# Patient Record
Sex: MALE | Race: WHITE | NOT HISPANIC OR LATINO | Employment: FULL TIME | URBAN - METROPOLITAN AREA
[De-identification: names, ages, dates, MRNs, and addresses within clinical notes are randomized per-mention and may not be internally consistent; named-entity substitution may affect disease eponyms.]

---

## 2018-06-03 ENCOUNTER — HOSPITAL ENCOUNTER (EMERGENCY)
Facility: HOSPITAL | Age: 18
Discharge: HOME | End: 2018-06-03
Attending: EMERGENCY MEDICINE | Admitting: EMERGENCY MEDICINE

## 2018-06-03 VITALS
SYSTOLIC BLOOD PRESSURE: 110 MMHG | HEART RATE: 93 BPM | RESPIRATION RATE: 16 BRPM | WEIGHT: 160 LBS | BODY MASS INDEX: 25.71 KG/M2 | DIASTOLIC BLOOD PRESSURE: 63 MMHG | HEIGHT: 66 IN | TEMPERATURE: 99.1 F | OXYGEN SATURATION: 92 %

## 2018-06-03 DIAGNOSIS — N61.0 ACUTE MASTITIS OF RIGHT BREAST: Primary | ICD-10-CM

## 2018-06-03 PROCEDURE — 99283 EMERGENCY DEPT VISIT LOW MDM: CPT

## 2018-06-03 RX ORDER — CLINDAMYCIN HYDROCHLORIDE 300 MG/1
300 CAPSULE ORAL 3 TIMES DAILY
Qty: 30 CAPSULE | Refills: 0 | Status: SHIPPED | OUTPATIENT
Start: 2018-06-03 | End: 2020-07-28

## 2018-06-03 RX ORDER — IBUPROFEN 800 MG/1
800 TABLET ORAL ONCE
Status: COMPLETED | OUTPATIENT
Start: 2018-06-03 | End: 2018-06-03

## 2018-06-03 RX ORDER — NAPROXEN 500 MG/1
500 TABLET ORAL 2 TIMES DAILY WITH MEALS
Qty: 14 TABLET | Refills: 0 | Status: SHIPPED | OUTPATIENT
Start: 2018-06-03 | End: 2020-07-28

## 2018-06-03 RX ORDER — PROGESTERONE 50 MG/ML
25 INJECTION, SOLUTION INTRAMUSCULAR
COMMUNITY
End: 2020-07-28

## 2018-06-03 RX ORDER — CLINDAMYCIN HYDROCHLORIDE 300 MG/1
300 CAPSULE ORAL ONCE
Status: COMPLETED | OUTPATIENT
Start: 2018-06-03 | End: 2018-06-03

## 2018-06-03 RX ORDER — ESTRADIOL VALERATE 10 MG/ML
0.12 INJECTION INTRAMUSCULAR
COMMUNITY
End: 2020-07-28

## 2018-06-03 RX ADMIN — CLINDAMYCIN HYDROCHLORIDE 300 MG: 300 CAPSULE ORAL at 04:43

## 2018-06-03 RX ADMIN — IBUPROFEN 800 MG: 800 TABLET ORAL at 04:43

## 2018-06-03 NOTE — ED PROVIDER NOTES
" EMERGENCY DEPARTMENT ENCOUNTER    CHIEF COMPLAINT  Chief Complaint: Breast Pain  History given by: Patient  History limited by:   Room Number: 19/19  PMD: Anthony Leary MD      HPI:  Pt is a 17 y.o. female who presents complaining of R breast pain that onset yesterday. Pt believes that she may have mastitis as she has had similar sxs in the past. Pt is taking estrogen and progesterone, but is not currently taking progesterone due to medication being on back order. She reports developing a mild subjective fever.    Duration: 1 day  Onset: gradual  Timing: constant  Location: R breast  Radiation: none  Quality: \"pain\"  Intensity/Severity: moderate  Progression: unchanged  Associated Symptoms: fever  Aggravating Factors: none  Alleviating Factors: none  Previous Episodes: similar sxs previously and was dx with mastitis  Treatment before arrival: none    PAST MEDICAL HISTORY  Active Ambulatory Problems     Diagnosis Date Noted   • No Active Ambulatory Problems     Resolved Ambulatory Problems     Diagnosis Date Noted   • No Resolved Ambulatory Problems     Past Medical History:   Diagnosis Date   • Mastitis        PAST SURGICAL HISTORY  History reviewed. No pertinent surgical history.    FAMILY HISTORY  History reviewed. No pertinent family history.    SOCIAL HISTORY  Social History     Social History   • Marital status: Single     Spouse name: N/A   • Number of children: N/A   • Years of education: N/A     Occupational History   • Not on file.     Social History Main Topics   • Smoking status: Never Smoker   • Smokeless tobacco: Not on file   • Alcohol use No   • Drug use: Yes     Types: Marijuana   • Sexual activity: Defer     Other Topics Concern   • Not on file     Social History Narrative   • No narrative on file       ALLERGIES  Vancomycin    REVIEW OF SYSTEMS  Review of Systems   Constitutional: Negative for fever.   HENT: Negative for sore throat.    Eyes: Negative.    Respiratory: Negative for cough " and shortness of breath.    Cardiovascular: Negative for chest pain.   Gastrointestinal: Negative for abdominal pain, diarrhea and vomiting.   Genitourinary: Negative for dysuria.   Musculoskeletal: Negative for neck pain.   Skin: Negative for rash.        Breast Pain   Allergic/Immunologic: Negative.    Neurological: Negative for weakness, numbness and headaches.   Hematological: Negative.    Psychiatric/Behavioral: Negative.    All other systems reviewed and are negative.      PHYSICAL EXAM  ED Triage Vitals   Temp Heart Rate Resp BP SpO2   06/03/18 0306 06/03/18 0258 06/03/18 0258 06/03/18 0306 06/03/18 0258   99.1 °F (37.3 °C) 101 16 121/68 100 %      Temp src Heart Rate Source Patient Position BP Location FiO2 (%)   06/03/18 0306 -- 06/03/18 0258 06/03/18 0258 --   Tympanic  Lying Right arm        Physical Exam   Constitutional: She is oriented to person, place, and time. No distress.   HENT:   Head: Normocephalic and atraumatic.   Eyes: EOM are normal. Pupils are equal, round, and reactive to light.   Neck: Normal range of motion. Neck supple.   Cardiovascular: Normal rate, regular rhythm and normal heart sounds.    Pulmonary/Chest: Effort normal and breath sounds normal. No respiratory distress. Right breast exhibits tenderness (with warmth and erythema).   Female chaperone present during breast exam   Abdominal: Soft. There is no tenderness. There is no rebound and no guarding.   Musculoskeletal: Normal range of motion. She exhibits no edema.   Neurological: She is alert and oriented to person, place, and time. She has normal sensation and normal strength.   Skin: Skin is warm and dry. No rash noted.   Psychiatric: Mood and affect normal.   Nursing note and vitals reviewed.        PROCEDURES  Procedures      PROGRESS AND CONSULTS     4:22 AM  Informed the pt of plan to discharge with Clindamycin and Naproxen. Pt understands and agrees with plan. All concerns addressed.  5:03 AM  Reviewed pt's history and  workup with Dr. Katz.        MEDICAL DECISION MAKING  Results were reviewed/discussed with the patient and they were also made aware of online access. Pt also made aware that some labs, such as cultures, will not be resulted during ER visit and follow up with PMD is necessary.     MDM  Number of Diagnoses or Management Options  Acute mastitis of right breast:      Amount and/or Complexity of Data Reviewed  Review and summarize past medical records: yes (Mastitis without abscess seen on US at [place] 1/2018. Pt started on Clindamycin.)           DIAGNOSIS  Final diagnoses:   Acute mastitis of right breast       DISPOSITION  DISCHARGE    Patient discharged in stable condition.    Reviewed implications of results, diagnosis, meds, responsibility to follow up, warning signs and symptoms of possible worsening, potential complications and reasons to return to ER.    Patient/Family voiced understanding of above instructions.    Discussed plan for discharge, as there is no emergent indication for admission. Patient referred to primary care provider for BP management due to today's BP. Pt/family is agreeable and understands need for follow up and repeat testing.  Pt is aware that discharge does not mean that nothing is wrong but it indicates no emergency is present that requires admission and they must continue care with follow-up as given below or physician of their choice.     FOLLOW-UP  Anthony Leary MD  [address] [address]  [phone]    Schedule an appointment as soon as possible for a visit in 3 days           Medication List      New Prescriptions    clindamycin 300 MG capsule  Commonly known as:  CLEOCIN  Take 1 capsule by mouth 3 (Three) Times a Day.     naproxen 500 MG tablet  Commonly known as:  NAPROSYN  Take 1 tablet by mouth 2 (Two) Times a Day With Meals.              Latest Documented Vital Signs:  As of 5:05 AM  BP- 110/63 HR- 93 Temp- 99.1 °F (37.3 °C) (Tympanic) O2 sat-  92%    --  Documentation assistance provided by danyel Angel for Flex Adan PA-C.  Information recorded by the scribe was done at my direction and has been verified and validated by me.     Marcelino Angel  06/03/18 0503       MEL Montague  06/03/18 0505

## 2018-06-03 NOTE — DISCHARGE INSTRUCTIONS
Home, rest, medicine as prescribed, apply warm compresses, follow up with PCP for recheck.  Return to care with further concerns.

## 2018-08-13 ENCOUNTER — HOSPITAL ENCOUNTER (EMERGENCY)
Facility: HOSPITAL | Age: 18
Discharge: HOME | End: 2018-08-13
Attending: EMERGENCY MEDICINE | Admitting: EMERGENCY MEDICINE

## 2018-08-13 VITALS
SYSTOLIC BLOOD PRESSURE: 110 MMHG | DIASTOLIC BLOOD PRESSURE: 70 MMHG | RESPIRATION RATE: 16 BRPM | OXYGEN SATURATION: 100 % | HEART RATE: 63 BPM | TEMPERATURE: 98.3 F

## 2018-08-13 DIAGNOSIS — K62.5 BRIGHT RED RECTAL BLEEDING: Primary | ICD-10-CM

## 2018-08-13 LAB
BASOPHILS # BLD AUTO: 0.04 10*3/MM3 (ref 0–0.2)
BASOPHILS NFR BLD AUTO: 0.5 % (ref 0–1.5)
DEPRECATED RDW RBC AUTO: 43.1 FL (ref 37–54)
EOSINOPHIL # BLD AUTO: 0.57 10*3/MM3 (ref 0–0.7)
EOSINOPHIL NFR BLD AUTO: 6.7 % (ref 0.3–6.2)
ERYTHROCYTE [DISTWIDTH] IN BLOOD BY AUTOMATED COUNT: 12.8 % (ref 11.7–13)
HCT VFR BLD AUTO: 37.9 % (ref 35.6–45.5)
HGB BLD-MCNC: 11.9 G/DL (ref 11.9–15.5)
IMM GRANULOCYTES # BLD: 0.02 10*3/MM3 (ref 0–0.03)
IMM GRANULOCYTES NFR BLD: 0.2 % (ref 0–0.5)
LYMPHOCYTES # BLD AUTO: 3.67 10*3/MM3 (ref 0.9–4.8)
LYMPHOCYTES NFR BLD AUTO: 43.3 % (ref 19.6–45.3)
MCH RBC QN AUTO: 28.7 PG (ref 26.9–32)
MCHC RBC AUTO-ENTMCNC: 31.4 G/DL (ref 32.4–36.3)
MCV RBC AUTO: 91.5 FL (ref 80.5–98.2)
MONOCYTES # BLD AUTO: 0.5 10*3/MM3 (ref 0.2–1.2)
MONOCYTES NFR BLD AUTO: 5.9 % (ref 5–12)
NEUTROPHILS # BLD AUTO: 3.67 10*3/MM3 (ref 1.9–8.1)
NEUTROPHILS NFR BLD AUTO: 43.4 % (ref 42.7–76)
PLATELET # BLD AUTO: 262 10*3/MM3 (ref 140–500)
PMV BLD AUTO: 10.1 FL (ref 6–12)
RBC # BLD AUTO: 4.14 10*6/MM3 (ref 3.9–5.2)
WBC NRBC COR # BLD: 8.47 10*3/MM3 (ref 4.5–10.7)

## 2018-08-13 PROCEDURE — 99283 EMERGENCY DEPT VISIT LOW MDM: CPT

## 2018-08-13 PROCEDURE — 85025 COMPLETE CBC W/AUTO DIFF WBC: CPT | Performed by: EMERGENCY MEDICINE

## 2018-08-13 NOTE — ED NOTES
"2 episodes of \"bright and dark\" red blood in stool today. Abd cramping for 2 months. Has not yet been seen for GI symptoms.     No recent trauma to abd. No urinary symptoms.     Skin pwd. Breathing even and unlabored. In harleen.      Amita Lyle RN  08/13/18 1906    "

## 2018-08-13 NOTE — ED NOTES
Pt requesting to have name changed and refuses to wear bracelet with former name. Reminded that registration will update all their information and that this RN does not want to incorrectly change information attached to the social security number that is in the system.     Thanked for pt's and family member's patience and understanding of the process     Amita Lyle RN  08/13/18 1798

## 2018-08-14 NOTE — ED PROVIDER NOTES
[facility] EMERGENCY DEPARTMENT ENCOUNTER    CHIEF COMPLAINT  Chief Complaint: rectal bleeding  History given by: patient  History limited by: none  [facility] Room Number: 38/38  PMD: Anthony Leary MD      HPI:  Pt is a 17 y.o. female who presents complaining of two episodes of rectal bleeding after having a BM. He advised after his BM he noticed as significant amount of blood in the bowl of the toilet and on the tissue paper. He noticed the blood was mixed with stool. Denies burning or itching of the rectum, but has some residual rectal pain and mild abdominal cramping.    Onset: sudden  Duration: w/ BM  Severity: moderate  Associated symptoms: rectal pain  Previous treatment: none    PAST MEDICAL HISTORY  Active Ambulatory Problems     Diagnosis Date Noted   • No Active Ambulatory Problems     Resolved Ambulatory Problems     Diagnosis Date Noted   • No Resolved Ambulatory Problems     Past Medical History:   Diagnosis Date   • Mastitis        PAST SURGICAL HISTORY  History reviewed. No pertinent surgical history.    FAMILY HISTORY  History reviewed. No pertinent family history.    SOCIAL HISTORY  Social History     Social History   • Marital status: Single     Spouse name: N/A   • Number of children: N/A   • Years of education: N/A     Occupational History   • Not on file.     Social History Main Topics   • Smoking status: Never Smoker   • Smokeless tobacco: Never Used   • Alcohol use No   • Drug use: Yes     Types: Marijuana   • Sexual activity: Defer     Other Topics Concern   • Not on file     Social History Narrative   • No narrative on file       ALLERGIES  Vancomycin    REVIEW OF SYSTEMS  Review of Systems   Constitutional: Negative for fever.   HENT: Negative for sore throat.    Eyes: Negative.    Respiratory: Negative for cough and shortness of breath.    Cardiovascular: Negative for chest pain.   Gastrointestinal: Positive for abdominal pain (cramping), blood in stool and rectal pain. Negative for diarrhea  and vomiting.   Genitourinary: Negative for dysuria.   Musculoskeletal: Negative for neck pain.   Skin: Negative for rash.   Allergic/Immunologic: Negative.    Neurological: Negative for weakness, numbness and headaches.   Hematological: Negative.    Psychiatric/Behavioral: Negative.    All other systems reviewed and are negative.      PHYSICAL EXAM  ED Triage Vitals [08/13/18 1900]   Temp Heart Rate Resp BP SpO2   98.3 °F (36.8 °C) 66 17 -- 99 %      Temp src Heart Rate Source Patient Position BP Location FiO2 (%)   -- -- -- -- --       Physical Exam   Constitutional: She is oriented to person, place, and time. No distress.   Eyes: EOM are normal.   Neck: Normal range of motion.   Cardiovascular: Normal rate and regular rhythm.    Pulmonary/Chest: Effort normal and breath sounds normal. No respiratory distress.   Abdominal: Soft. Bowel sounds are normal. She exhibits no distension. There is no tenderness. There is no rebound and no guarding.   Genitourinary:   Genitourinary Comments: Rectal exam: no active bleeding or external hemorrhoids.   Neurological: She is alert and oriented to person, place, and time. She has normal sensation and normal strength.   Skin: Skin is warm and dry.   Psychiatric: Affect normal.   Nursing note and vitals reviewed.      LAB RESULTS  Lab Results (last 24 hours)     Procedure Component Value Units Date/Time    CBC & Differential [[id number]] Collected:  08/13/18 2022    Specimen:  Blood Updated:  08/13/18 2039    Narrative:       The following orders were created for panel order CBC & Differential.  Procedure                               Abnormality         Status                     ---------                               -----------         ------                     CBC Auto Differential[[id number]]        Abnormal            Final result                 Please view results for these tests on the individual orders.    CBC Auto Differential [[id number]]  (Abnormal) Collected:   08/13/18 2022    Specimen:  Blood Updated:  08/13/18 2039     WBC 8.47 10*3/mm3      RBC 4.14 10*6/mm3      Hemoglobin 11.9 g/dL      Hematocrit 37.9 %      MCV 91.5 fL      MCH 28.7 pg      MCHC 31.4 (L) g/dL      RDW 12.8 %      RDW-SD 43.1 fl      MPV 10.1 fL      Platelets 262 10*3/mm3      Neutrophil % 43.4 %      Lymphocyte % 43.3 %      Monocyte % 5.9 %      Eosinophil % 6.7 (H) %      Basophil % 0.5 %      Immature Grans % 0.2 %      Neutrophils, Absolute 3.67 10*3/mm3      Lymphocytes, Absolute 3.67 10*3/mm3      Monocytes, Absolute 0.50 10*3/mm3      Eosinophils, Absolute 0.57 10*3/mm3      Basophils, Absolute 0.04 10*3/mm3      Immature Grans, Absolute 0.02 10*3/mm3           I ordered the above labs and reviewed the results    RADIOLOGY  No orders to display        I ordered the above noted radiological studies. Interpreted by radiologist. Reviewed by me in PACS.       PROCEDURES  Procedures      PROGRESS AND CONSULTS      2010  Discussed the plan to do blood work and if nml will d/c w/ referral to colorectal surgery. Pt understands and agrees with the plan, all questions answered.    MEDICAL DECISION MAKING  Results were reviewed/discussed with the patient and they were also made aware of online access. Pt also made aware that some labs, such as cultures, will not be resulted during ER visit and follow up with PMD is necessary.     MDM  Number of Diagnoses or Management Options     Amount and/or Complexity of Data Reviewed  Clinical lab tests: reviewed (unremarkable)    Patient Progress  Patient progress: stable         DIAGNOSIS  Final diagnoses:   Bright red rectal bleeding       DISPOSITION  DISCHARGE    Patient discharged in stable condition.    Reviewed implications of results, diagnosis, meds, responsibility to follow up, warning signs and symptoms of possible worsening, potential complications and reasons to return to ER.    Patient/Family voiced understanding of above instructions.    Discussed  plan for discharge, as there is no emergent indication for admission. Patient referred to primary care provider for BP management due to today's BP. Pt/family is agreeable and understands need for follow up and repeat testing.  Pt is aware that discharge does not mean that nothing is wrong but it indicates no emergency is present that requires admission and they must continue care with follow-up as given below or physician of their choice.     FOLLOW-UP  Danielle Malloy MD  [address] [address]  [phone]    Call            Medication List      No changes were made to your prescriptions during this visit.       Latest Documented Vital Signs:  As of 8:49 PM  BP-   HR- 66 Temp- 98.3 °F (36.8 °C) O2 sat- 99%    --  Documentation assistance provided by danyel Dinero for Dr. Carrero.  Information recorded by the scribe was done at my direction and has been verified and validated by me.     Edita Dinero  08/13/18 3157       Mir Carrero MD  08/13/18 2584

## 2020-07-01 ENCOUNTER — TELEPHONE (OUTPATIENT)
Dept: FAMILY MEDICINE CLINIC | Facility: CLINIC | Age: 20
End: 2020-07-01

## 2020-07-01 NOTE — TELEPHONE ENCOUNTER
RHODA WITH [facility] CALLED TO REQUEST A PA FOR THE MEDICATION Estradiol Valerate 10 MG/ML oil IT WILL NEED TO HAVE A DIAGNOSIS CODE AND THE REST OF THE FORM TO BE FILLED OUT. RHODA REQUESTED TO GET A CALL BACK    BEST CALL BACK  [phone]

## 2020-07-06 NOTE — TELEPHONE ENCOUNTER
Ayah with [facility] states she was unaware that a form needed to be sent , and  is going to fax the form today.

## 2020-07-08 ENCOUNTER — TELEPHONE (OUTPATIENT)
Dept: FAMILY MEDICINE CLINIC | Facility: CLINIC | Age: 20
End: 2020-07-08

## 2020-07-08 NOTE — TELEPHONE ENCOUNTER
Ayah with [facility] called back in checking the status of a PA. She said that she faxed the PA Form to the office on 7/6 and wants to know if the office received it?    Please call back to confirm @ [phone]

## 2020-07-09 PROBLEM — N61.0 ACUTE MASTITIS: Status: ACTIVE | Noted: 2018-01-05

## 2020-07-09 PROBLEM — IMO0001 ENDOCRINE DISORDER IN MALE-TO-FEMALE TRANSGENDER PERSON: Status: ACTIVE | Noted: 2017-07-06

## 2020-07-13 ENCOUNTER — TELEPHONE (OUTPATIENT)
Dept: FAMILY MEDICINE CLINIC | Facility: CLINIC | Age: 20
End: 2020-07-13

## 2020-07-13 NOTE — TELEPHONE ENCOUNTER
PA for pt came back denied. Called pt to provide information and somebody who passed by pt during call listened the information and hung up phone rudely.  Called again to make sure phone call did not just fall down, and hung up on my after asking what was the person speaking at the phone.

## 2020-07-28 ENCOUNTER — OFFICE VISIT (OUTPATIENT)
Dept: FAMILY MEDICINE CLINIC | Facility: CLINIC | Age: 20
End: 2020-07-28

## 2020-07-28 VITALS
HEART RATE: 71 BPM | HEIGHT: 67 IN | DIASTOLIC BLOOD PRESSURE: 70 MMHG | OXYGEN SATURATION: 98 % | RESPIRATION RATE: 16 BRPM | TEMPERATURE: 98 F | BODY MASS INDEX: 21.47 KG/M2 | SYSTOLIC BLOOD PRESSURE: 106 MMHG | WEIGHT: 136.8 LBS

## 2020-07-28 DIAGNOSIS — R41.840 ATTENTION DISTURBANCE: ICD-10-CM

## 2020-07-28 DIAGNOSIS — IMO0001 HORMONAL IMBALANCE IN TRANSGENDER PATIENT: Primary | ICD-10-CM

## 2020-07-28 PROCEDURE — 99203 OFFICE O/P NEW LOW 30 MIN: CPT | Performed by: FAMILY MEDICINE

## 2020-07-28 RX ORDER — ESTRADIOL VALERATE 20 MG/ML
6 INJECTION INTRAMUSCULAR 2 TIMES WEEKLY
Qty: 5 ML | Refills: 3 | Status: SHIPPED | OUTPATIENT
Start: 2020-07-30 | End: 2020-09-30 | Stop reason: SDUPTHER

## 2020-07-28 NOTE — PROGRESS NOTES
"    Nadya Oseguera is a 19 y.o. male.     Chief Complaint   Patient presents with   • Hormone therapy replacement       HPI     Here today to reestablish care.  Previously seen by myself at [facility].  Has been on gender affirming hormone therapy off and on for several years.  Due to lapse in insurance has not been on medications for several months now.    Previously maintained on injectable estradiol valerate and injectable progesterone.  States that she would like to switch to oral progesterone if possible.  Needs prescriptions for all medications and injection supplies.  Was previously doing subcutaneous injections without any problems, no injection site reactions.    Worried she might have some tension deficit causing issues with frequent job loss and various relationships.  Has never been formally tested, would like to do so.  Describes difficulty multitasking, frequently losing her place in conversations, and feeling \"frazzled\".  Admits that current living situation is challenging.  Far too many people in a small space, hopeful that she and her friend can find a new living situation in the next few months.    The following portions of the patient's history were reviewed and updated as appropriate: allergies, current medications, past family history, past medical history, past social history, past surgical history and problem list.    Review of Systems   Constitutional: Negative for chills, fatigue, fever and unexpected weight change.   HENT: Negative for sore throat.    Respiratory: Negative for cough, shortness of breath and wheezing.    Cardiovascular: Negative for chest pain, palpitations and leg swelling.   Gastrointestinal: Negative for abdominal distention, abdominal pain, constipation, diarrhea, nausea and vomiting.   Genitourinary: Negative for dysuria.   Musculoskeletal: Negative for arthralgias and myalgias.   Skin: Negative for rash.   Neurological: Negative for dizziness.   Psychiatric/Behavioral: " "Positive for decreased concentration. Negative for confusion and dysphoric mood. The patient is not nervous/anxious.         Memory issues     I have reviewed and confirmed the ROS as documented by the MA. Anthony Leary MD    Objective  Vitals:    07/28/20 1411   BP: 106/70   Pulse: 71   Resp: 16   Temp: 98 °F (36.7 °C)   SpO2: 98%     Body mass index is 21.75 kg/m².    Physical Exam   Constitutional: He is oriented to person, place, and time. He appears well-developed and well-nourished. No distress.   HENT:   Right Ear: External ear normal.   Left Ear: External ear normal.   Nose: Nose normal.   Mouth/Throat: Oropharynx is clear and moist.   Eyes: Pupils are equal, round, and reactive to light. EOM are normal.   Neck: Normal range of motion. Neck supple.   Cardiovascular: Normal rate, regular rhythm, normal heart sounds and intact distal pulses.   No murmur heard.  Pulmonary/Chest: Effort normal and breath sounds normal. No respiratory distress. He has no wheezes.   Abdominal: Soft. Bowel sounds are normal. There is no tenderness. There is no rebound and no guarding.   Musculoskeletal: Normal range of motion.   Neurological: He is alert and oriented to person, place, and time.   Skin: Skin is warm and dry.   Psychiatric: He has a normal mood and affect. His behavior is normal.   Nursing note and vitals reviewed.        Current Outpatient Medications:   •  [START ON 7/30/2020] estradiol valerate (DELESTROGEN) 20 MG/ML injection, Inject 0.3 mL into the appropriate muscle as directed by prescriber 2 (Two) Times a Week., Disp: 5 mL, Rfl: 3  •  Needle, Disp, (B-D HYPODERMIC NEEDLE 25GX5/8\") 25G X 5/8\" misc, 1 Units 1 (One) Time Per Week for 360 days., Disp: 50 each, Rfl: 1  •  [START ON 7/30/2020] Needle, Disp, (BD Hypodermic Needle) 18G X 1\" misc, 1 Units 2 (Two) Times a Week for 360 days., Disp: 50 each, Rfl: 2  •  progesterone (PROMETRIUM) 100 MG capsule, Take 1 capsule by mouth Daily for 180 days., Disp: " "90 capsule, Rfl: 1  •  [START ON 7/30/2020] Syringe, Disposable, (Easy Glide Luer Lock Syringe) 1 ML misc, 1 Units 2 (Two) Times a Week for 360 days., Disp: 50 each, Rfl: 2  Current outpatient and discharge medications have been reconciled for the patient.  Reviewed by: Anthony Leary MD      Procedures    Lab Results (most recent)     None                  Nadya was seen today for hormone therapy replacement.    Diagnoses and all orders for this visit:    Hormonal imbalance in transgender patient  -     estradiol valerate (DELESTROGEN) 20 MG/ML injection; Inject 0.3 mL into the appropriate muscle as directed by prescriber 2 (Two) Times a Week.  -     Syringe, Disposable, (Easy Glide Luer Lock Syringe) 1 ML misc; 1 Units 2 (Two) Times a Week for 360 days.  -     Needle, Disp, (BD Hypodermic Needle) 18G X 1\" misc; 1 Units 2 (Two) Times a Week for 360 days.  -     Needle, Disp, (B-D HYPODERMIC NEEDLE 25GX5/8\") 25G X 5/8\" misc; 1 Units 1 (One) Time Per Week for 360 days.  -     progesterone (PROMETRIUM) 100 MG capsule; Take 1 capsule by mouth Daily for 180 days.    Attention disturbance  -     Ambulatory Referral to Neuropsychology    Orders as above.  Plan to check levels in about 2 months.  Encouraged her to let me know how she is doing with this.  Referral to neuropsych as above for formal ADHD testing.    Follow-up as below.  Encouraged to communicate via [employer] in the meantime.    Any information loaded into the AVS was placed there by AMARILYS Leary MD, and patient was counseled on the same.   Return in about 2 months (around 9/28/2020).      AMARILYS Leary MD      "

## 2020-07-28 NOTE — PATIENT INSTRUCTIONS
Transfemale Resources    [facility] Transgender Support Group - Facebook group    [employer] for Transgender Health, Standards of Care  https://www.wpath.org/publications/soc - pages 38 and 40 especially     [facility], Transgender Health - http://transhealth.[facility].edu/guidelines      [facility] - [email or web address]-[email or web address]    [facility] - [email or web address]/medical/[email or web address]-health/     Parents, Families, and Friends of Les[place]ians and [place] - https://www.pflag.org/ourtranslovedones     [facility] for Transgender [place] - https://transequality.org/documents     * * * * * *     www.[employer]     1cc, Luer-Steve Tip  Syringes - ex: https://www.[employer]/dp/N12QPIAD2K     18 G x 1.5” hypodermic needles for drawing up - ex: https://www.[employer]/dp/P61F4ZM18B     25 G x 1.5” hypodermic needles for injecting - ex: https://www[employer]/dp/Y78XZXA9X5/     [facility] Trans Health Injection Guide -   https://[facility].org/wp-content/uploads/MG-6_TransHealth_InjectionGuide.pdf     Injection supplies:  Syringes   Needles   Alcohol swabs  Cotton balls  Sharps container

## 2020-08-03 DIAGNOSIS — IMO0001 HORMONAL IMBALANCE IN TRANSGENDER PATIENT: ICD-10-CM

## 2020-08-03 DIAGNOSIS — IMO0001 HORMONAL IMBALANCE IN TRANSGENDER PATIENT: Primary | ICD-10-CM

## 2020-08-03 RX ORDER — ESTRADIOL 0.1 MG/D
1 FILM, EXTENDED RELEASE TRANSDERMAL 2 TIMES WEEKLY
Qty: 8 EACH | Refills: 2 | Status: SHIPPED | OUTPATIENT
Start: 2020-08-03 | End: 2020-08-04 | Stop reason: SDUPTHER

## 2020-08-03 RX ORDER — ESTRADIOL 0.1 MG/D
1 FILM, EXTENDED RELEASE TRANSDERMAL 2 TIMES WEEKLY
Qty: 8 EACH | Refills: 2 | Status: SHIPPED | OUTPATIENT
Start: 2020-08-03 | End: 2020-08-03 | Stop reason: SDUPTHER

## 2020-08-04 DIAGNOSIS — IMO0001 HORMONAL IMBALANCE IN TRANSGENDER PATIENT: ICD-10-CM

## 2020-08-04 RX ORDER — ESTRADIOL 0.1 MG/D
1 FILM, EXTENDED RELEASE TRANSDERMAL 2 TIMES WEEKLY
Qty: 8 EACH | Refills: 2 | Status: SHIPPED | OUTPATIENT
Start: 2020-08-06 | End: 2020-09-30

## 2020-08-17 ENCOUNTER — OFFICE VISIT (OUTPATIENT)
Dept: FAMILY MEDICINE CLINIC | Facility: CLINIC | Age: 20
End: 2020-08-17

## 2020-08-17 VITALS
BODY MASS INDEX: 21.03 KG/M2 | HEIGHT: 67 IN | RESPIRATION RATE: 16 BRPM | WEIGHT: 134 LBS | DIASTOLIC BLOOD PRESSURE: 64 MMHG | HEART RATE: 66 BPM | SYSTOLIC BLOOD PRESSURE: 108 MMHG | OXYGEN SATURATION: 99 % | TEMPERATURE: 97 F

## 2020-08-17 DIAGNOSIS — R23.8 SCALP IRRITATION: ICD-10-CM

## 2020-08-17 DIAGNOSIS — K60.2 ANAL FISSURE: ICD-10-CM

## 2020-08-17 DIAGNOSIS — Z72.51 HIGH RISK SEXUAL BEHAVIOR, UNSPECIFIED TYPE: ICD-10-CM

## 2020-08-17 DIAGNOSIS — K62.89 ANAL PAIN: ICD-10-CM

## 2020-08-17 DIAGNOSIS — K59.09 CHRONIC CONSTIPATION: Primary | ICD-10-CM

## 2020-08-17 PROCEDURE — 99214 OFFICE O/P EST MOD 30 MIN: CPT | Performed by: FAMILY MEDICINE

## 2020-08-17 RX ORDER — DEXTROAMPHETAMINE SACCHARATE, AMPHETAMINE ASPARTATE MONOHYDRATE, DEXTROAMPHETAMINE SULFATE AND AMPHETAMINE SULFATE 2.5; 2.5; 2.5; 2.5 MG/1; MG/1; MG/1; MG/1
15 CAPSULE, EXTENDED RELEASE ORAL EVERY MORNING
COMMUNITY
Start: 2020-08-06 | End: 2020-12-30 | Stop reason: SDUPTHER

## 2020-08-17 NOTE — PROGRESS NOTES
Nadya Oseguera is 19 y.o. and presents today for:     Chief Complaint   Patient presents with   • scalp irritation       HPI     Here today for complaint of scalp irritation, desire for STI screening, and anal pain.    Has noticed some small sores on her scalp, worried initially that it might be a fungal infection.  Treated it at home with apple cider vinegar with some clearance.  Has noticed some intermittent itching.  Tries to use a sensitive skin shampoo and daily conditioner.    Would like to do STI screening today.  No specific concerning encounters, just likes to keep up with it.  No current symptoms.    Complains of chronic and worsening anal pain.  Thinks that she has had at least one anal fissure, currently having a difficult time with stooling and with sexual intercourse.  Has diagnosed herself with sentinel piles, worried that she seems to have some hairs growing out of them, and is very bothered by this.  Has actually pluck these hairs and caused further irritation.  Wondering if she needs to possibly get some of this redundant tissue removed in order to have that her comfort.  Very frustrated by her lack of ability to have sexual intercourse.    Has a longstanding history of chronic constipation.  Typically stools every other day or so now, usually hard formed stool.  Occasionally uses digital disimpaction due to the pain.  Admits that she could probably drink more water.  Has never tried a bowel regimen per se.    The following portions of the patient's history were reviewed and updated as appropriate: allergies, current medications, past family history, past medical history, past social history, past surgical history and problem list.    Review of Systems   Constitutional: Negative for activity change, chills, fatigue and fever.   Respiratory: Negative for chest tightness and shortness of breath.    Cardiovascular: Negative for chest pain and palpitations.   Gastrointestinal: Positive for anal  "bleeding, constipation and rectal pain.   Skin: Positive for rash.   Psychiatric/Behavioral: Positive for decreased concentration. Negative for dysphoric mood. The patient is not nervous/anxious.        Objective  Vitals:    08/17/20 1156   BP: 108/64   Pulse: 66   Resp: 16   Temp: 97 °F (36.1 °C)   SpO2: 99%     Body mass index is 21.3 kg/m².    Physical Exam   Constitutional: He is oriented to person, place, and time. He appears well-developed and well-nourished.   Cardiovascular: Normal rate, regular rhythm, normal heart sounds and intact distal pulses.   No murmur heard.  Pulmonary/Chest: Effort normal and breath sounds normal. No respiratory distress. He has no wheezes.   Abdominal: Soft. Bowel sounds are normal. He exhibits no distension. There is no tenderness.   Genitourinary: Rectal exam shows fissure.   Genitourinary Comments: What appears to be sentinel piles at the 6 and 12 positions, slight tenderness on palpation, no obvious areas of open tissue, deferred digital exam today due to pain.   Neurological: He is alert and oriented to person, place, and time.   Skin:   A few scattered erythematous macules on scalp, no flaking, no hair loss, no concern for fungal infection   Psychiatric: He has a normal mood and affect. His behavior is normal.   Nursing note and vitals reviewed.        Current Outpatient Medications:   •  amphetamine-dextroamphetamine XR (ADDERALL XR) 10 MG 24 hr capsule, Take 10 mg by mouth Every Morning, Disp: , Rfl:   •  estradiol (MINIVELLE, VIVELLE-DOT) 0.1 MG/24HR patch, Place 1 patch on the skin as directed by provider 2 (Two) Times a Week., Disp: 8 each, Rfl: 2  •  estradiol valerate (DELESTROGEN) 20 MG/ML injection, Inject 0.3 mL into the appropriate muscle as directed by prescriber 2 (Two) Times a Week., Disp: 5 mL, Rfl: 3  •  Needle, Disp, (B-D HYPODERMIC NEEDLE 25GX5/8\") 25G X 5/8\" misc, 1 Units 1 (One) Time Per Week for 360 days., Disp: 50 each, Rfl: 1  •  Needle, Disp, (BD " "Hypodermic Needle) 18G X 1\" misc, 1 Units 2 (Two) Times a Week for 360 days., Disp: 50 each, Rfl: 2  •  polyethylene glycol (MIRALAX) powder powder, Mix one unit dose in 12-16 oz of water and take 1-4 times daily to begin having soft daily BMs. Taper down to once daily dosing as needed., Disp: 850 g, Rfl: 5  •  progesterone (PROMETRIUM) 100 MG capsule, Take 1 capsule by mouth Daily for 180 days., Disp: 90 capsule, Rfl: 1  •  psyllium (Metamucil Smooth Texture) 58.6 % powder, Take  by mouth 3 (Three) Times a Day., Disp: 1040 g, Rfl: 12  •  Syringe, Disposable, (Easy Glide Luer Lock Syringe) 1 ML misc, 1 Units 2 (Two) Times a Week for 360 days., Disp: 50 each, Rfl: 2  Current outpatient and discharge medications have been reconciled for the patient.  Reviewed by: nAthony Leary MD      Procedures    Lab Results (most recent)     None                Nadya was seen today for scalp irritation.    Diagnoses and all orders for this visit:    Chronic constipation  -     polyethylene glycol (MIRALAX) powder powder; Mix one unit dose in 12-16 oz of water and take 1-4 times daily to begin having soft daily BMs. Taper down to once daily dosing as needed.  -     psyllium (Metamucil Smooth Texture) 58.6 % powder; Take  by mouth 3 (Three) Times a Day.  -     Ambulatory Referral to Colorectal Surgery    Anal fissure  -     polyethylene glycol (MIRALAX) powder powder; Mix one unit dose in 12-16 oz of water and take 1-4 times daily to begin having soft daily BMs. Taper down to once daily dosing as needed.  -     psyllium (Metamucil Smooth Texture) 58.6 % powder; Take  by mouth 3 (Three) Times a Day.  -     Ambulatory Referral to Colorectal Surgery    Anal pain  -     polyethylene glycol (MIRALAX) powder powder; Mix one unit dose in 12-16 oz of water and take 1-4 times daily to begin having soft daily BMs. Taper down to once daily dosing as needed.  -     psyllium (Metamucil Smooth Texture) 58.6 % powder; Take  by mouth 3 " (Three) Times a Day.  -     Ambulatory Referral to Colorectal Surgery    High risk sexual behavior, unspecified type  -     Cancel: Ct / GC LIAM, Pharyngeal - Swab, Throat  -     Cancel: CT / NG PCR, Rectal - Swab, Large Intestine, Rectum  -     Chlamydia trachomatis, Neisseria gonorrhoeae, PCR - Urine, Urine, Clean Catch  -     RPR  -     HIV-1 / O / 2 Ag / Antibody 4th Generation    Scalp irritation    Orders as above.  We talked at length about a proper bowel regimen to maintain good bowel health.  I do think that increased hydration will help as well as the PEG and Metamucil as above.  Encouraged her to use baby wipes or a bidet for hygiene.  Will refer to colorectal surgeon for further evaluation and management.    STI testing as above.  I will contact her with results as soon as they are available.    Recommended some over-the-counter selenium sulfide shampoo for general scalp irritation.  Also recommended infrequent shampooing.    Her return to clinic as below, communicate via [employer] in the meantime.    Any information loaded into the AVS was placed there by AMARILYS Leary MD, and patient was counseled on the same.   Return in about 1 month (around 9/17/2020), or if symptoms worsen or fail to improve.      AMARILYS Leary MD

## 2020-08-18 LAB
HIV 1+2 AB+HIV1 P24 AG SERPL QL IA: NON REACTIVE
RPR SER QL: NORMAL

## 2020-08-19 LAB
C TRACH RRNA SPEC QL NAA+PROBE: NEGATIVE
N GONORRHOEA RRNA SPEC QL NAA+PROBE: NEGATIVE

## 2020-09-21 ENCOUNTER — OFFICE VISIT (OUTPATIENT)
Dept: SURGERY | Facility: CLINIC | Age: 20
End: 2020-09-21

## 2020-09-21 VITALS
WEIGHT: 139.4 LBS | HEIGHT: 66 IN | TEMPERATURE: 97.4 F | HEART RATE: 91 BPM | SYSTOLIC BLOOD PRESSURE: 120 MMHG | DIASTOLIC BLOOD PRESSURE: 84 MMHG | BODY MASS INDEX: 22.4 KG/M2 | OXYGEN SATURATION: 98 %

## 2020-09-21 DIAGNOSIS — K60.2 FISSURE, ANAL: Primary | ICD-10-CM

## 2020-09-21 PROCEDURE — 99244 OFF/OP CNSLTJ NEW/EST MOD 40: CPT | Performed by: COLON & RECTAL SURGERY

## 2020-09-21 NOTE — PROGRESS NOTES
Nadya Oseguera is a 19 y.o.  is seen as a consult at the request of Anthony Leary, * for Abdominal Pain (past.), Rectal Bleeding (past.), and Rectal Pain.    HPI:  W/ bm - pain  occas rb- none for one month  + ano receptive intercourse. 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 VALERIA WEST AT [facility]       Social History:   reports that he has been smoking electronic cigarette. He has never used smokeless tobacco. He reports current drug use. Drug: Marijuana. He reports that he does not drink alcohol.    Marriage status: Single    Family History   Problem Relation Age of Onset   • Depression Mother    • Clotting disorder Mother    • Hypertension Mother    • Sleep apnea Mother    • Obesity Mother    • COPD Maternal Grandmother    • Drug abuse Father    • Prostate cancer Neg Hx    • Colon cancer Neg Hx          Current Outpatient Medications:   •  amphetamine-dextroamphetamine XR (ADDERALL XR) 10 MG 24 hr capsule, Take 15 mg by mouth Every Morning  , Disp: , Rfl:   •  estradiol (MINIVELLE, VIVELLE-DOT) 0.1 MG/24HR patch, Place 1 patch on the skin as  "directed by provider 2 (Two) Times a Week., Disp: 8 each, Rfl: 2  •  Needle, Disp, (BD Hypodermic Needle) 18G X 1\" misc, 1 Units 2 (Two) Times a Week for 360 days., Disp: 50 each, Rfl: 2  •  progesterone (PROMETRIUM) 100 MG capsule, Take 1 capsule by mouth Daily for 180 days., Disp: 90 capsule, Rfl: 1  •  estradiol valerate (DELESTROGEN) 20 MG/ML injection, Inject 0.3 mL into the appropriate muscle as directed by prescriber 2 (Two) Times a Week., Disp: 5 mL, Rfl: 3  •  Needle, Disp, (B-D HYPODERMIC NEEDLE 25GX5/8\") 25G X 5/8\" misc, 1 Units 1 (One) Time Per Week for 360 days., Disp: 50 each, Rfl: 1  •  Syringe, Disposable, (Easy Glide Luer Lock Syringe) 1 ML misc, 1 Units 2 (Two) Times a Week for 360 days., Disp: 50 each, Rfl: 2    Allergy  Vancomycin    Review of Systems   Constitution: Positive for decreased appetite. Negative for weight gain.   HENT: Negative for congestion, hearing loss and hoarse voice.    Eyes: Negative for blurred vision, discharge and visual disturbance.   Cardiovascular: Negative for chest pain, cyanosis and leg swelling.   Respiratory: Negative for cough, shortness of breath, sleep disturbances due to breathing and snoring.    Endocrine: Negative for cold intolerance and heat intolerance.   Hematologic/Lymphatic: Does not bruise/bleed easily.   Skin: Negative for itching, poor wound healing and skin cancer.   Musculoskeletal: Positive for back pain. Negative for arthritis, joint pain and joint swelling.   Gastrointestinal: Negative for abdominal pain, change in bowel habit, bowel incontinence and constipation.   Genitourinary: Negative for bladder incontinence, dysuria and hematuria.   Neurological: Negative for brief paralysis, excessive daytime sleepiness, dizziness, focal weakness, headaches, light-headedness and weakness.   Psychiatric/Behavioral: Negative for altered mental status and hallucinations. The patient does not have insomnia.    Allergic/Immunologic: Negative for HIV exposure " and persistent infections.   All other systems reviewed and are negative.      Vitals:    09/21/20 1446   BP: 120/84   Pulse: 91   Temp: 97.4 °F (36.3 °C)   SpO2: 98%     Body mass index is 22.5 kg/m².    Physical Exam  Exam conducted with a chaperone present.   Constitutional:       General: He is not in acute distress.     Appearance: He is well-developed.   HENT:      Head: Normocephalic and atraumatic.      Nose: Nose normal.   Eyes:      Conjunctiva/sclera: Conjunctivae normal.      Pupils: Pupils are equal, round, and reactive to light.   Neck:      Musculoskeletal: Normal range of motion.      Trachea: No tracheal deviation.   Pulmonary:      Effort: Pulmonary effort is normal. No respiratory distress.      Breath sounds: Normal breath sounds.   Abdominal:      General: Bowel sounds are normal. There is no distension.      Palpations: Abdomen is soft.   Genitourinary:     Comments: Perianal exam - posterior fissure  Musculoskeletal: Normal range of motion.         General: No deformity.   Skin:     General: Skin is warm and dry.   Neurological:      Mental Status: He is alert and oriented to person, place, and time.      Cranial Nerves: No cranial nerve deficit.      Coordination: Coordination normal.      Gait: Gait normal.   Psychiatric:         Behavior: Behavior normal.         Judgment: Judgment normal.         Assessment:  1. Fissure, anal        Plan:    I discussed with patient options on treating fissure: lateral internal anal sphincterotomy, chemical sphincterotomy with Botox, or topical creams of nitroglycerin, diltiazem, or nifedipine.  I discussed risks and benefits of all.  Risks of the lateral internal anal sphincterotomy are small chance of fecal incontinence, slow wound healing, and it is an invasive procedure versus the other 2 options, but the benefit is 97% success in fixing a fissure.  Chemical sphincterotomy has the benefit of no permanent fecal incontinence but a 80-85% success rate.   The topical creams have no incontinence risk, but are slow to heal the fissure and are only 80% successful.    I wrote for diltiazem and lidocaine cream to be placed on the anus 3 times a day.  I recommended taking MiraLAX and fiber daily.  I gave out written instructions.   Patient to follow-up in 5 weeks.

## 2020-09-30 ENCOUNTER — OFFICE VISIT (OUTPATIENT)
Dept: FAMILY MEDICINE CLINIC | Facility: CLINIC | Age: 20
End: 2020-09-30

## 2020-09-30 VITALS
WEIGHT: 135 LBS | SYSTOLIC BLOOD PRESSURE: 104 MMHG | TEMPERATURE: 97 F | RESPIRATION RATE: 16 BRPM | DIASTOLIC BLOOD PRESSURE: 64 MMHG | OXYGEN SATURATION: 96 % | BODY MASS INDEX: 21.79 KG/M2 | HEART RATE: 99 BPM

## 2020-09-30 DIAGNOSIS — Z79.899 HIGH RISK MEDICATION USE: ICD-10-CM

## 2020-09-30 DIAGNOSIS — Z23 NEED FOR VACCINATION: ICD-10-CM

## 2020-09-30 DIAGNOSIS — IMO0001 HORMONAL IMBALANCE IN TRANSGENDER PATIENT: Primary | ICD-10-CM

## 2020-09-30 PROBLEM — F90.2 ATTENTION DEFICIT HYPERACTIVITY DISORDER (ADHD), COMBINED TYPE: Status: ACTIVE | Noted: 2020-09-30

## 2020-09-30 PROCEDURE — 90686 IIV4 VACC NO PRSV 0.5 ML IM: CPT | Performed by: FAMILY MEDICINE

## 2020-09-30 PROCEDURE — 90471 IMMUNIZATION ADMIN: CPT | Performed by: FAMILY MEDICINE

## 2020-09-30 PROCEDURE — 99213 OFFICE O/P EST LOW 20 MIN: CPT | Performed by: FAMILY MEDICINE

## 2020-09-30 PROCEDURE — 90472 IMMUNIZATION ADMIN EACH ADD: CPT | Performed by: FAMILY MEDICINE

## 2020-09-30 PROCEDURE — 90715 TDAP VACCINE 7 YRS/> IM: CPT | Performed by: FAMILY MEDICINE

## 2020-09-30 RX ORDER — ESTRADIOL VALERATE 20 MG/ML
6 INJECTION INTRAMUSCULAR 2 TIMES WEEKLY
Qty: 5 ML | Refills: 3 | Status: SHIPPED | OUTPATIENT
Start: 2020-10-01 | End: 2020-11-04 | Stop reason: SDUPTHER

## 2020-09-30 NOTE — PROGRESS NOTES
Nadya Oseguera is 19 y.o. and presents today for:     Chief Complaint   Patient presents with   • Hormone therapy replacement       HPI     Didn't tolerate estradiol patch, had rather severe local skin reaction.  Requesting a refill of estradiol for injection.  Would also like to get some baseline labs done today so that she can follow her hormone changes as she begins injections.    Due for both flu shot and tetanus booster today.    The following portions of the patient's history were reviewed and updated as appropriate: allergies, current medications, past family history, past medical history, past social history, past surgical history and problem list.    Review of Systems   Constitutional: Negative for activity change, chills, fatigue and fever.   Respiratory: Negative for chest tightness and shortness of breath.    Cardiovascular: Negative for chest pain and palpitations.       Objective  Vitals:    09/30/20 1534   BP: 104/64   Pulse: 99   Resp: 16   Temp: 97 °F (36.1 °C)   SpO2: 96%     Body mass index is 21.79 kg/m².    Physical Exam  Vitals signs and nursing note reviewed.   Constitutional:       Appearance: Normal appearance.   Cardiovascular:      Rate and Rhythm: Normal rate and regular rhythm.      Pulses: Normal pulses.      Heart sounds: Normal heart sounds. No murmur.   Pulmonary:      Effort: Pulmonary effort is normal. No respiratory distress.      Breath sounds: Normal breath sounds. No rales.   Neurological:      Mental Status: She is alert and oriented to person, place, and time. Mental status is at baseline.   Psychiatric:         Mood and Affect: Mood normal.         Behavior: Behavior normal.           Current Outpatient Medications:   •  amphetamine-dextroamphetamine XR (ADDERALL XR) 10 MG 24 hr capsule, Take 15 mg by mouth Every Morning  , Disp: , Rfl:   •  [START ON 10/1/2020] estradiol valerate (DELESTROGEN) 20 MG/ML injection, Inject 0.3 mL into the appropriate muscle as directed  "by prescriber 2 (Two) Times a Week., Disp: 5 mL, Rfl: 3  •  Needle, Disp, (B-D HYPODERMIC NEEDLE 25GX5/8\") 25G X 5/8\" misc, 1 Units 1 (One) Time Per Week for 360 days., Disp: 50 each, Rfl: 1  •  Needle, Disp, (BD Hypodermic Needle) 18G X 1\" misc, 1 Units 2 (Two) Times a Week for 360 days., Disp: 50 each, Rfl: 2  •  progesterone (PROMETRIUM) 100 MG capsule, Take 1 capsule by mouth Daily for 180 days., Disp: 90 capsule, Rfl: 1  •  Syringe, Disposable, (Easy Glide Luer Lock Syringe) 1 ML misc, 1 Units 2 (Two) Times a Week for 360 days., Disp: 50 each, Rfl: 2  Current outpatient and discharge medications have been reconciled for the patient.  Reviewed by: Anthony Leary MD      Procedures    Lab Results (most recent)     None                Nadya was seen today for hormone therapy replacement.    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    High risk medication use  -     Estradiol  -     Estrone  -     Testosterone  -     Lipid Panel With LDL / HDL Ratio  -     Comprehensive Metabolic Panel  -     Hepatitis C Antibody    Need for vaccination  -     FluLaval Quad >6 Months ([id number])  -     Tdap Vaccine Greater Than or Equal To 8yo IM    Orders as above.  I will contact her with lab results as they are available.  We will discuss changes to her regimen as necessary.  Refilled estradiol as above.  Will be sure that the PA goes through this time.    Vaccinations as requested.    Any information loaded into the AVS was placed there by AMARILYS Leary MD, and patient was counseled on the same.   Return in about 3 months (around 12/30/2020), or if symptoms worsen or " fail to improve.      AMARILYS Leary MD

## 2020-10-01 LAB
ALBUMIN SERPL-MCNC: 4.7 G/DL (ref 4.1–5.2)
ALBUMIN/GLOB SERPL: 1.9 {RATIO} (ref 1.2–2.2)
ALP SERPL-CCNC: 78 IU/L (ref 39–117)
ALT SERPL-CCNC: 13 IU/L (ref 0–44)
AST SERPL-CCNC: 20 IU/L (ref 0–40)
BILIRUB SERPL-MCNC: 0.7 MG/DL (ref 0–1.2)
BUN SERPL-MCNC: 12 MG/DL (ref 6–20)
BUN/CREAT SERPL: 14 (ref 9–20)
CALCIUM SERPL-MCNC: 9.7 MG/DL (ref 8.7–10.2)
CHLORIDE SERPL-SCNC: 99 MMOL/L (ref 96–106)
CHOLEST SERPL-MCNC: 199 MG/DL (ref 100–199)
CO2 SERPL-SCNC: 27 MMOL/L (ref 20–29)
CREAT SERPL-MCNC: 0.85 MG/DL (ref 0.76–1.27)
ESTRADIOL SERPL-MCNC: 31.4 PG/ML (ref 7.6–42.6)
GLOBULIN SER CALC-MCNC: 2.5 G/DL (ref 1.5–4.5)
GLUCOSE SERPL-MCNC: 86 MG/DL (ref 65–99)
HCV AB S/CO SERPL IA: 0.2 S/CO RATIO (ref 0–0.9)
HDLC SERPL-MCNC: 49 MG/DL
LDLC SERPL CALC-MCNC: 139 MG/DL (ref 0–99)
LDLC/HDLC SERPL: 2.8 RATIO (ref 0–3.6)
POTASSIUM SERPL-SCNC: 4.5 MMOL/L (ref 3.5–5.2)
PROT SERPL-MCNC: 7.2 G/DL (ref 6–8.5)
SODIUM SERPL-SCNC: 142 MMOL/L (ref 134–144)
TESTOST SERPL-MCNC: 506 NG/DL (ref 264–916)
TRIGL SERPL-MCNC: 61 MG/DL (ref 0–149)
VLDLC SERPL CALC-MCNC: 11 MG/DL (ref 5–40)

## 2020-10-08 LAB — ESTRONE SERPL-MCNC: 84 PG/ML (ref 15–65)

## 2020-11-04 ENCOUNTER — TELEMEDICINE (OUTPATIENT)
Dept: FAMILY MEDICINE CLINIC | Facility: CLINIC | Age: 20
End: 2020-11-04

## 2020-11-04 DIAGNOSIS — IMO0001 HORMONAL IMBALANCE IN TRANSGENDER PATIENT: ICD-10-CM

## 2020-11-04 DIAGNOSIS — L21.9 SEBORRHEIC DERMATITIS OF SCALP: Primary | ICD-10-CM

## 2020-11-04 DIAGNOSIS — B35.0 TINEA CAPITIS: ICD-10-CM

## 2020-11-04 PROCEDURE — 99214 OFFICE O/P EST MOD 30 MIN: CPT | Performed by: FAMILY MEDICINE

## 2020-11-04 RX ORDER — ESTRADIOL VALERATE 20 MG/ML
6 INJECTION INTRAMUSCULAR 2 TIMES WEEKLY
Qty: 5 ML | Refills: 3 | Status: SHIPPED | OUTPATIENT
Start: 2020-11-05 | End: 2020-12-30 | Stop reason: SDUPTHER

## 2020-11-04 RX ORDER — KETOCONAZOLE 20 MG/ML
SHAMPOO TOPICAL 2 TIMES WEEKLY
Qty: 120 ML | Refills: 2 | Status: SHIPPED | OUTPATIENT
Start: 2020-11-05 | End: 2020-12-30 | Stop reason: SDUPTHER

## 2020-11-04 RX ORDER — TERBINAFINE HYDROCHLORIDE 250 MG/1
250 TABLET ORAL DAILY
Qty: 21 TABLET | Refills: 0 | Status: SHIPPED | OUTPATIENT
Start: 2020-11-04 | End: 2020-11-25

## 2020-11-04 NOTE — PROGRESS NOTES
VIDEO VISIT  Nadya Oseguera is 19 y.o. and is seen via video today for:     Chief Complaint   Patient presents with   • Seborrheic Dermatitis       HPI     Wanting to discuss ongoing problems with some flaking and redness in her eyebrows and around her scalp.  Also has 1 lesion on the crown.  We have been treating previously as seborrheic dermatitis with some selenium shampoo.  Seems to improve the symptoms very briefly.  Hoping for more aggressive care.    Has not yet picked up estradiol from the pharmacy.  Has also not check to see if they have it.  Was thinking that it needed authorization.  From my understanding insurance has approved it.    The following portions of the patient's history were reviewed and updated as appropriate: allergies, current medications, past family history, past medical history, past social history, past surgical history and problem list.    Review of Systems   Constitutional: Negative for activity change, chills, fatigue and fever.   Respiratory: Negative for chest tightness and shortness of breath.    Cardiovascular: Negative for chest pain and palpitations.   Skin: Positive for rash.         Objective  There were no vitals filed for this visit.  There is no height or weight on file to calculate BMI.    Physical Exam  Constitutional:       General: She is not in acute distress.     Appearance: Normal appearance. She is not ill-appearing.   Eyes:      Extraocular Movements: Extraocular movements intact.      Conjunctiva/sclera: Conjunctivae normal.   Neck:      Musculoskeletal: Normal range of motion and neck supple.   Pulmonary:      Effort: Pulmonary effort is normal. No respiratory distress.   Skin:     Comments: Line of erythema around the edge of her scalp anteriorly and in the eyebrows with some flaking of the skin.  There is also a patch of scaly erythema on the crown of her head with broken hair shafts.   Neurological:      Mental Status: She is alert and oriented to  "person, place, and time.   Psychiatric:         Mood and Affect: Mood normal.         Behavior: Behavior normal.           Current Outpatient Medications:   •  amphetamine-dextroamphetamine XR (ADDERALL XR) 10 MG 24 hr capsule, Take 15 mg by mouth Every Morning  , Disp: , Rfl:   •  [START ON 11/5/2020] estradiol valerate (DELESTROGEN) 20 MG/ML injection, Inject 0.3 mL into the appropriate muscle as directed by prescriber 2 (Two) Times a Week., Disp: 5 mL, Rfl: 3  •  [START ON 11/5/2020] ketoconazole (NIZORAL) 2 % shampoo, Apply  topically to the appropriate area as directed 2 (Two) Times a Week., Disp: 120 mL, Rfl: 2  •  Needle, Disp, (B-D HYPODERMIC NEEDLE 25GX5/8\") 25G X 5/8\" misc, 1 Units 1 (One) Time Per Week for 360 days., Disp: 50 each, Rfl: 1  •  Needle, Disp, (BD Hypodermic Needle) 18G X 1\" misc, 1 Units 2 (Two) Times a Week for 360 days., Disp: 50 each, Rfl: 2  •  progesterone (PROMETRIUM) 100 MG capsule, Take 1 capsule by mouth Daily for 180 days., Disp: 90 capsule, Rfl: 1  •  Syringe, Disposable, (Easy Glide Luer Lock Syringe) 1 ML misc, 1 Units 2 (Two) Times a Week for 360 days., Disp: 50 each, Rfl: 2  •  terbinafine (lamiSIL) 250 MG tablet, Take 1 tablet by mouth Daily for 21 days., Disp: 21 tablet, Rfl: 0  Current outpatient and discharge medications have been reconciled for the patient.  Reviewed by: Anthony Leary MD      Procedures    Lab Results (most recent)     None                  Diagnoses and all orders for this visit:    1. Seborrheic dermatitis of scalp (Primary)  -     ketoconazole (NIZORAL) 2 % shampoo; Apply  topically to the appropriate area as directed 2 (Two) Times a Week.  Dispense: 120 mL; Refill: 2    2. 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  Dispense: 5 mL; Refill: 3    3. Tinea capitis  -     terbinafine (lamiSIL) 250 MG " tablet; Take 1 tablet by mouth Daily for 21 days.  Dispense: 21 tablet; Refill: 0    Orders as above.  We will try some ketoconazole for seborrheic dermatitis.  It may be that the terbinafine for tinea capitis also helps treat this.    We will resend estradiol to a pharmacy where I believe they keep it in stock.  She will restart hormonal therapy as we have discussed previously.    Follow-up as below.  Encouraged to communicate via [employer] in the meantime.    Any information loaded into the AVS was placed there by AMARILYS Leary MD, and patient was counseled on the same.   Return in about 3 months (around 2/4/2021), or if symptoms worsen or fail to improve.      AMARILYS Leary MD

## 2020-12-30 ENCOUNTER — OFFICE VISIT (OUTPATIENT)
Dept: FAMILY MEDICINE CLINIC | Facility: CLINIC | Age: 20
End: 2020-12-30

## 2020-12-30 VITALS
BODY MASS INDEX: 23.89 KG/M2 | RESPIRATION RATE: 16 BRPM | WEIGHT: 148 LBS | HEART RATE: 96 BPM | TEMPERATURE: 96.9 F | DIASTOLIC BLOOD PRESSURE: 72 MMHG | SYSTOLIC BLOOD PRESSURE: 130 MMHG | OXYGEN SATURATION: 98 %

## 2020-12-30 DIAGNOSIS — F90.2 ATTENTION DEFICIT HYPERACTIVITY DISORDER (ADHD), COMBINED TYPE: ICD-10-CM

## 2020-12-30 DIAGNOSIS — IMO0001 HORMONAL IMBALANCE IN TRANSGENDER PATIENT: Primary | ICD-10-CM

## 2020-12-30 DIAGNOSIS — L21.9 SEBORRHEIC DERMATITIS OF SCALP: ICD-10-CM

## 2020-12-30 PROCEDURE — 99214 OFFICE O/P EST MOD 30 MIN: CPT | Performed by: FAMILY MEDICINE

## 2020-12-30 RX ORDER — DEXTROAMPHETAMINE SACCHARATE, AMPHETAMINE ASPARTATE MONOHYDRATE, DEXTROAMPHETAMINE SULFATE AND AMPHETAMINE SULFATE 3.75; 3.75; 3.75; 3.75 MG/1; MG/1; MG/1; MG/1
15 CAPSULE, EXTENDED RELEASE ORAL EVERY MORNING
Qty: 30 CAPSULE | Refills: 0 | Status: SHIPPED | OUTPATIENT
Start: 2020-12-30 | End: 2021-12-23 | Stop reason: SDUPTHER

## 2020-12-30 RX ORDER — ESTRADIOL VALERATE 20 MG/ML
6 INJECTION INTRAMUSCULAR 2 TIMES WEEKLY
Qty: 5 ML | Refills: 3 | Status: SHIPPED | OUTPATIENT
Start: 2020-12-31 | End: 2021-01-04 | Stop reason: SDUPTHER

## 2020-12-30 RX ORDER — KETOCONAZOLE 20 MG/ML
SHAMPOO TOPICAL 2 TIMES WEEKLY
Qty: 120 ML | Refills: 2 | Status: SHIPPED | OUTPATIENT
Start: 2020-12-31 | End: 2021-12-23

## 2020-12-30 NOTE — PROGRESS NOTES
Nadya Oseguera is 20 y.o. and presents today for:     Chief Complaint   Patient presents with   • Med Refill       HPI     Here as above. Needs refills on several meds today. Admits that she never picked up estradiol after last prescription. Thinks that perhaps her pharmacy was never able to obtain any. She did not follow-up with them very aggressively. No point checking labs today as she is not been on her regimen.    Did find some improved scalp with ketoconazole shampoo. Hoping for refill of this today.    Also hoping that I might take over prescription of her Adderall. She was taking 15 mg long-acting with decent control of her symptoms and an increased ability to get through her day. Was diagnosed several years ago. Tolerated the medication well with no change in sleep or appetite.    The following portions of the patient's history were reviewed and updated as appropriate: allergies, current medications, past family history, past medical history, past social history, past surgical history and problem list.    Review of Systems   Constitutional: Negative for activity change, chills, fatigue and fever.   Respiratory: Negative for chest tightness and shortness of breath.    Cardiovascular: Negative for chest pain and palpitations.   Skin: Negative for rash.   Psychiatric/Behavioral: Positive for decreased concentration. Negative for dysphoric mood. The patient is not nervous/anxious.        Objective  Vitals:    12/30/20 1617   BP: 130/72   Pulse: 96   Resp: 16   Temp: 96.9 °F (36.1 °C)   SpO2: 98%     Body mass index is 23.89 kg/m².    Physical Exam  Vitals signs and nursing note reviewed.   Constitutional:       General: She is not in acute distress.     Appearance: Normal appearance. She is not ill-appearing.   Cardiovascular:      Rate and Rhythm: Normal rate and regular rhythm.      Pulses: Normal pulses.      Heart sounds: Normal heart sounds. No murmur.   Pulmonary:      Effort: Pulmonary effort  is normal. No respiratory distress.      Breath sounds: Normal breath sounds.   Skin:     Comments: No seborrheic dermatitis noted   Neurological:      Mental Status: She is alert and oriented to person, place, and time. Mental status is at baseline.   Psychiatric:         Mood and Affect: Mood normal.         Behavior: Behavior normal.           Current Outpatient Medications:   •  amphetamine-dextroamphetamine XR (ADDERALL XR) 15 MG 24 hr capsule, Take 1 capsule by mouth Every Morning, Disp: 30 capsule, Rfl: 0  •  [START ON 12/31/2020] estradiol valerate (DELESTROGEN) 20 MG/ML injection, Inject 0.3 mL into the appropriate muscle as directed by prescriber 2 (Two) Times a Week., Disp: 5 mL, Rfl: 3  •  [START ON 12/31/2020] ketoconazole (NIZORAL) 2 % shampoo, Apply  topically to the appropriate area as directed 2 (Two) Times a Week., Disp: 120 mL, Rfl: 2  •  progesterone (PROMETRIUM) 100 MG capsule, Take 1 capsule by mouth Daily for 180 days., Disp: 90 capsule, Rfl: 1  Current outpatient and discharge medications have been reconciled for the patient.  Reviewed by: Anthony Leary MD      Procedures    Lab Results (most recent)     None                Diagnoses and all orders for this visit:    1. Hormonal imbalance in transgender patient (Primary)  -     estradiol valerate (DELESTROGEN) 20 MG/ML injection; Inject 0.3 mL into the appropriate muscle as directed by prescriber 2 (Two) Times a Week.  Dispense: 5 mL; Refill: 3  -     progesterone (PROMETRIUM) 100 MG capsule; Take 1 capsule by mouth Daily for 180 days.  Dispense: 90 capsule; Refill: 1    2. Attention deficit hyperactivity disorder (ADHD), combined type  -     amphetamine-dextroamphetamine XR (ADDERALL XR) 15 MG 24 hr capsule; Take 1 capsule by mouth Every Morning  Dispense: 30 capsule; Refill: 0    3. Seborrheic dermatitis of scalp  -     ketoconazole (NIZORAL) 2 % shampoo; Apply  topically to the appropriate area as directed 2 (Two) Times a Week.   Dispense: 120 mL; Refill: 2    Orders as above. Encouraged her to get back on a stable regimen and will check labs in about a month or so.    Refilled ketoconazole and Adderall as requested. Recommended follow-up as below. Encouraged communication via [employer] in the meantime.    Any information loaded into the AVS was placed there by AMARILYS Leary MD, and patient was counseled on the same.   Return in about 3 months (around 3/30/2021), or if symptoms worsen or fail to improve.      AMARILYS Leary MD

## 2021-01-04 DIAGNOSIS — IMO0001 HORMONAL IMBALANCE IN TRANSGENDER PATIENT: ICD-10-CM

## 2021-01-04 RX ORDER — ESTRADIOL VALERATE 20 MG/ML
6 INJECTION INTRAMUSCULAR 2 TIMES WEEKLY
Qty: 15 ML | Refills: 1 | Status: SHIPPED | OUTPATIENT
Start: 2021-01-04 | End: 2021-05-15 | Stop reason: SDUPTHER

## 2021-02-24 ENCOUNTER — HOSPITAL ENCOUNTER (EMERGENCY)
Facility: HOSPITAL | Age: 21
Discharge: HOME | End: 2021-02-24
Attending: EMERGENCY MEDICINE | Admitting: EMERGENCY MEDICINE

## 2021-02-24 ENCOUNTER — APPOINTMENT (OUTPATIENT)
Dept: GENERAL RADIOLOGY | Facility: HOSPITAL | Age: 21
End: 2021-02-24

## 2021-02-24 VITALS
OXYGEN SATURATION: 99 % | HEIGHT: 66 IN | RESPIRATION RATE: 16 BRPM | WEIGHT: 148 LBS | HEART RATE: 99 BPM | TEMPERATURE: 97.1 F | SYSTOLIC BLOOD PRESSURE: 117 MMHG | BODY MASS INDEX: 23.78 KG/M2 | DIASTOLIC BLOOD PRESSURE: 76 MMHG

## 2021-02-24 DIAGNOSIS — S61.211A LACERATION OF LEFT INDEX FINGER WITHOUT FOREIGN BODY WITHOUT DAMAGE TO NAIL, INITIAL ENCOUNTER: Primary | ICD-10-CM

## 2021-02-24 PROCEDURE — 25010000003 LIDOCAINE 1 % SOLUTION: Performed by: PHYSICIAN ASSISTANT

## 2021-02-24 PROCEDURE — 73140 X-RAY EXAM OF FINGER(S): CPT

## 2021-02-24 PROCEDURE — 99283 EMERGENCY DEPT VISIT LOW MDM: CPT

## 2021-02-24 PROCEDURE — 99282 EMERGENCY DEPT VISIT SF MDM: CPT

## 2021-02-24 RX ORDER — LIDOCAINE HYDROCHLORIDE 10 MG/ML
10 INJECTION, SOLUTION INFILTRATION; PERINEURAL ONCE
Status: COMPLETED | OUTPATIENT
Start: 2021-02-24 | End: 2021-02-24

## 2021-02-24 RX ADMIN — LIDOCAINE HYDROCHLORIDE 10 ML: 10 INJECTION, SOLUTION INFILTRATION; PERINEURAL at 02:50

## 2021-03-04 ENCOUNTER — OFFICE VISIT (OUTPATIENT)
Dept: FAMILY MEDICINE CLINIC | Facility: CLINIC | Age: 21
End: 2021-03-04

## 2021-03-04 VITALS
HEART RATE: 75 BPM | SYSTOLIC BLOOD PRESSURE: 110 MMHG | DIASTOLIC BLOOD PRESSURE: 68 MMHG | RESPIRATION RATE: 16 BRPM | WEIGHT: 149 LBS | TEMPERATURE: 98.9 F | BODY MASS INDEX: 24.05 KG/M2 | OXYGEN SATURATION: 99 %

## 2021-03-04 DIAGNOSIS — Z48.02 ENCOUNTER FOR REMOVAL OF SUTURES: Primary | ICD-10-CM

## 2021-03-04 PROCEDURE — 99212 OFFICE O/P EST SF 10 MIN: CPT | Performed by: FAMILY MEDICINE

## 2021-03-04 NOTE — PROGRESS NOTES
Chief Complaint  Wound Check    Subjective    History of Present Illness {CC  Problem List  Visit  Diagnosis   Encounters  Notes  Medications  Labs  Result Review Imaging  Media :23}     Nadya Oseguera presents to [facility] for Wound Check.  History of Present Illness     Here today for removal of sutures from her left third finger.  Accidentally cut herself while cooking, had a flap-like laceration across her third PIP.  Was seen in urgent care and 6 sutures were placed 8 days ago.  Was advised to follow-up between 7 and 10 days for suture removal.  Has been keeping the wound clean and dry.  Has good movement.  Has not had any dehiscence of the wound.    Objective     Vital Signs:   /68   Pulse 75   Temp 98.9 °F (37.2 °C)   Resp 16   Wt 67.6 kg (149 lb)   SpO2 99%   BMI 24.05 kg/m²   Physical Exam  Skin:     Comments: 6 interrupted nylon sutures across a laceration that extends across the left third PIP, appears well-healed          Result Review  Data Reviewed:{ Labs  Result Review  Imaging  Med Tab  Media :23}                   Assessment and Plan {CC Problem List  Visit Diagnosis  ROS  Review (Popup)  Health Maintenance  Quality  BestPractice  Medications  SmartSets  SnapShot Encounters  Media :23}   Diagnoses and all orders for this visit:    1. Encounter for removal of sutures (Primary)    Sutures were removed without difficulty.  Tolerated well.  No wound dehiscence.  Dressed with a Band-Aid and given wound care instructions.      Follow Up {Instructions Charge Capture  Follow-up Communications :23}     Patient was given instructions and counseling regarding her condition or for health maintenance advice. Please see specific information pulled into the AVS (placed there by myself) if appropriate.    Return in about 3 months (around 6/4/2021), or if symptoms worsen or fail to improve.      AMARILYS Leary MD

## 2021-04-16 ENCOUNTER — BULK ORDERING (OUTPATIENT)
Dept: CASE MANAGEMENT | Facility: OTHER | Age: 21
End: 2021-04-16

## 2021-04-16 DIAGNOSIS — Z23 IMMUNIZATION DUE: ICD-10-CM

## 2021-05-15 DIAGNOSIS — IMO0001 HORMONAL IMBALANCE IN TRANSGENDER PATIENT: ICD-10-CM

## 2021-05-15 RX ORDER — ESTRADIOL VALERATE 20 MG/ML
6 INJECTION INTRAMUSCULAR 2 TIMES WEEKLY
Qty: 15 ML | Refills: 1 | Status: SHIPPED | OUTPATIENT
Start: 2021-05-17 | End: 2021-09-09 | Stop reason: SDUPTHER

## 2021-09-09 ENCOUNTER — OFFICE VISIT (OUTPATIENT)
Dept: FAMILY MEDICINE CLINIC | Facility: CLINIC | Age: 21
End: 2021-09-09

## 2021-09-09 VITALS
BODY MASS INDEX: 23.4 KG/M2 | HEART RATE: 78 BPM | WEIGHT: 145 LBS | SYSTOLIC BLOOD PRESSURE: 110 MMHG | OXYGEN SATURATION: 99 % | RESPIRATION RATE: 16 BRPM | DIASTOLIC BLOOD PRESSURE: 58 MMHG

## 2021-09-09 DIAGNOSIS — IMO0001 HORMONAL IMBALANCE IN TRANSGENDER PATIENT: Primary | ICD-10-CM

## 2021-09-09 DIAGNOSIS — Z51.81 THERAPEUTIC DRUG MONITORING: ICD-10-CM

## 2021-09-09 DIAGNOSIS — J34.89 NASAL SEPTAL PERFORATION: ICD-10-CM

## 2021-09-09 PROCEDURE — 99214 OFFICE O/P EST MOD 30 MIN: CPT | Performed by: FAMILY MEDICINE

## 2021-09-09 RX ORDER — NEEDLES, SAFETY 27GX1/2"
1 NEEDLE, DISPOSABLE MISCELLANEOUS 2 TIMES WEEKLY
Qty: 100 EACH | Refills: 1 | Status: SHIPPED | OUTPATIENT
Start: 2021-09-09 | End: 2022-02-01

## 2021-09-09 RX ORDER — PRAZOSIN HYDROCHLORIDE 1 MG/1
2 CAPSULE ORAL DAILY
COMMUNITY
Start: 2021-06-28 | End: 2021-12-23

## 2021-09-09 RX ORDER — ESTRADIOL VALERATE 20 MG/ML
6 INJECTION INTRAMUSCULAR 2 TIMES WEEKLY
Qty: 15 ML | Refills: 1 | Status: SHIPPED | OUTPATIENT
Start: 2021-09-09 | End: 2022-11-01 | Stop reason: SDUPTHER

## 2021-09-09 NOTE — PROGRESS NOTES
Chief Complaint  hormonal imblance in transgender patient    Subjective    History of Present Illness {CC  Problem List  Visit  Diagnosis   Encounters  Notes  Medications  Labs  Result Review Imaging  Media :23}     Nadya Oseguera presents to [facility] for hormonal imblance in transgender patient.  History of Present Illness     Here for f/u as above. Fell off regimen during COVID, has been back on for several months now. Due for labs today. Doesn't want to start progresterone yet, would like to be a bit more stable with estradiol first. No problems with self-administration, no injection site reactions.     Hoping for a referral to ENT today. Has a chronic nasal septal perforation, worried that it's getting bigger, would like to discuss repair options.     Objective     Vital Signs:   /58   Pulse 78   Resp 16   Wt 65.8 kg (145 lb)   SpO2 99%   BMI 23.40 kg/m²   Physical Exam  Vitals and nursing note reviewed.   Constitutional:       General: She is not in acute distress.     Appearance: Normal appearance. She is not ill-appearing.   Cardiovascular:      Rate and Rhythm: Normal rate and regular rhythm.      Pulses: Normal pulses.      Heart sounds: Normal heart sounds. No murmur heard.     Pulmonary:      Effort: Pulmonary effort is normal. No respiratory distress.      Breath sounds: Normal breath sounds. No rales.   Neurological:      Mental Status: She is alert and oriented to person, place, and time. Mental status is at baseline.   Psychiatric:         Mood and Affect: Mood normal.         Behavior: Behavior normal.          Result Review  Data Reviewed:{ Labs  Result Review  Imaging  Med Tab  Media :23}                   Assessment and Plan {CC Problem List  Visit Diagnosis  ROS  Review (Popup)  Health Maintenance  Quality  BestPractice  Medications  SmartSets  SnapShot Encounters  Media :23}   Diagnoses and all orders for this visit:    1.  "Hormonal imbalance in transgender patient (Primary)  -     Estradiol  -     Estrone  -     Testosterone  -     Lipid Panel With LDL / HDL Ratio  -     Comprehensive Metabolic Panel  -     estradiol valerate (DELESTROGEN) 20 MG/ML injection; Inject 0.3 mL into the appropriate muscle as directed by prescriber 2 (Two) Times a Week.  Dispense: 15 mL; Refill: 1  -     Needle, Disp, (Easy Touch FlipLock Needles) 27G X 1/2\" misc; 1 Units 2 (Two) Times a Week.  Dispense: 100 each; Refill: 1    2. Therapeutic drug monitoring  -     Estradiol  -     Estrone  -     Testosterone  -     Lipid Panel With LDL / HDL Ratio  -     Comprehensive Metabolic Panel    3. Nasal septal perforation  -     Ambulatory Referral to ENT (Otolaryngology)    Orders as above. I'll respond with results as available.    Referral as requested.     Continue current regimen as prescribed. Will discuss starting progesterone in 3-6 months.     Follow up as below. Encouraged to communicate via [employer] in the meantime.    Follow Up {Instructions Charge Capture  Follow-up Communications :23}     Patient was given instructions and counseling regarding her condition or for health maintenance advice. Please see specific information pulled into the AVS (placed there by myself) if appropriate.    Return in about 3 months (around 12/9/2021), or if symptoms worsen or fail to improve, for F/u gender-affirming hormone therapy.      AMARILYS Leary MD      "

## 2021-09-10 LAB
ALBUMIN SERPL-MCNC: 4.3 G/DL (ref 4.1–5.2)
ALBUMIN/GLOB SERPL: 2 {RATIO} (ref 1.2–2.2)
ALP SERPL-CCNC: 49 IU/L (ref 48–121)
ALT SERPL-CCNC: 10 IU/L (ref 0–44)
AST SERPL-CCNC: 18 IU/L (ref 0–40)
BILIRUB SERPL-MCNC: 0.5 MG/DL (ref 0–1.2)
BUN SERPL-MCNC: 8 MG/DL (ref 6–20)
BUN/CREAT SERPL: 12 (ref 9–20)
CALCIUM SERPL-MCNC: 9.1 MG/DL (ref 8.7–10.2)
CHLORIDE SERPL-SCNC: 102 MMOL/L (ref 96–106)
CHOLEST SERPL-MCNC: 174 MG/DL (ref 100–199)
CO2 SERPL-SCNC: 24 MMOL/L (ref 20–29)
CREAT SERPL-MCNC: 0.66 MG/DL (ref 0.76–1.27)
ESTRADIOL SERPL-MCNC: 767 PG/ML (ref 7.6–42.6)
GLOBULIN SER CALC-MCNC: 2.1 G/DL (ref 1.5–4.5)
GLUCOSE SERPL-MCNC: 81 MG/DL (ref 65–99)
HDLC SERPL-MCNC: 55 MG/DL
LDLC SERPL CALC-MCNC: 109 MG/DL (ref 0–99)
LDLC/HDLC SERPL: 2 RATIO (ref 0–3.6)
POTASSIUM SERPL-SCNC: 4.1 MMOL/L (ref 3.5–5.2)
PROT SERPL-MCNC: 6.4 G/DL (ref 6–8.5)
SODIUM SERPL-SCNC: 138 MMOL/L (ref 134–144)
TESTOST SERPL-MCNC: 13 NG/DL (ref 264–916)
TRIGL SERPL-MCNC: 51 MG/DL (ref 0–149)
VLDLC SERPL CALC-MCNC: 10 MG/DL (ref 5–40)

## 2021-09-13 LAB — ESTRONE SERPL-MCNC: 151 PG/ML (ref 15–65)

## 2021-12-23 ENCOUNTER — OFFICE VISIT (OUTPATIENT)
Dept: FAMILY MEDICINE CLINIC | Facility: CLINIC | Age: 21
End: 2021-12-23

## 2021-12-23 VITALS
WEIGHT: 140 LBS | HEIGHT: 66 IN | DIASTOLIC BLOOD PRESSURE: 62 MMHG | OXYGEN SATURATION: 99 % | SYSTOLIC BLOOD PRESSURE: 112 MMHG | BODY MASS INDEX: 22.5 KG/M2 | RESPIRATION RATE: 12 BRPM | HEART RATE: 79 BPM

## 2021-12-23 DIAGNOSIS — F90.2 ATTENTION DEFICIT HYPERACTIVITY DISORDER (ADHD), COMBINED TYPE: ICD-10-CM

## 2021-12-23 PROCEDURE — 99213 OFFICE O/P EST LOW 20 MIN: CPT | Performed by: FAMILY MEDICINE

## 2021-12-23 RX ORDER — DEXTROAMPHETAMINE SACCHARATE, AMPHETAMINE ASPARTATE MONOHYDRATE, DEXTROAMPHETAMINE SULFATE AND AMPHETAMINE SULFATE 3.75; 3.75; 3.75; 3.75 MG/1; MG/1; MG/1; MG/1
15 CAPSULE, EXTENDED RELEASE ORAL EVERY MORNING
Qty: 90 CAPSULE | Refills: 0 | Status: SHIPPED | OUTPATIENT
Start: 2021-12-23 | End: 2022-02-01

## 2021-12-23 RX ORDER — PROGESTERONE 100 MG/1
100 CAPSULE ORAL DAILY
COMMUNITY
Start: 2021-09-21 | End: 2022-11-01

## 2021-12-23 NOTE — PROGRESS NOTES
"Chief Complaint  ADHD and hormone therapy    Subjective    History of Present Illness {CC  Problem List  Visit  Diagnosis   Encounters  Notes  Medications  Labs  Result Review Imaging  Media :23}     Nadya Oseguera presents to [facility] for ADHD and hormone therapy.  History of Present Illness     Here today as above. Has been doing fairly well overall though ADHD remains uncontrolled. Psychiatrist has left practice and she has yet to establish with somebody new. Hoping that I might take over prescribing for short stent. Did well with Adderall in the past. Has a very hard time with task completion otherwise. Is in trouble at work for [employer]. Is currently working to get accommodations. PDMP is reviewed and appropriate today.    Objective     Vital Signs:   /62   Pulse 79   Resp 12   Ht 167.6 cm (66\")   Wt 63.5 kg (140 lb)   SpO2 99%   BMI 22.60 kg/m²   Physical Exam  Vitals and nursing note reviewed.   Constitutional:       General: She is not in acute distress.     Appearance: Normal appearance. She is not ill-appearing.   Cardiovascular:      Rate and Rhythm: Normal rate and regular rhythm.      Pulses: Normal pulses.      Heart sounds: Normal heart sounds. No murmur heard.      Pulmonary:      Effort: Pulmonary effort is normal. No respiratory distress.      Breath sounds: Normal breath sounds. No rales.   Neurological:      Mental Status: She is alert and oriented to person, place, and time. Mental status is at baseline.   Psychiatric:         Mood and Affect: Mood is anxious.         Speech: Speech is tangential.         Behavior: Behavior normal.          Result Review  Data Reviewed:{ Labs  Result Review  Imaging  Med Tab  Media :23}                   Assessment and Plan {CC Problem List  Visit Diagnosis  ROS  Review (Popup)  Health Maintenance  Quality  BestPractice  Medications  SmartSets  SnapShot Encounters  Media :23}   Diagnoses " and all orders for this visit:    1. Attention deficit hyperactivity disorder (ADHD), combined type  -     amphetamine-dextroamphetamine XR (ADDERALL XR) 15 MG 24 hr capsule; Take 1 capsule by mouth Every Morning  Dispense: 90 capsule; Refill: 0    Will refill as requested for the next 3 months. Encouraged her to find a new psychiatrist in the meantime. Happy to fill out whatever form she needs for accommodations but she will need to talk with her employer first.    Recommended follow-up as below. Encouraged communication via [employer] the meantime.      Follow Up {Instructions Charge Capture  Follow-up Communications :23}     Patient was given instructions and counseling regarding her condition or for health maintenance advice. Please see specific information pulled into the AVS (placed there by myself) if appropriate.    Return in about 3 months (around 3/23/2022).      AMARILYS Leary MD

## 2022-02-01 DIAGNOSIS — F41.9 ANXIETY: ICD-10-CM

## 2022-02-01 DIAGNOSIS — F90.2 ATTENTION DEFICIT HYPERACTIVITY DISORDER (ADHD), COMBINED TYPE: Primary | ICD-10-CM

## 2022-10-24 DIAGNOSIS — K01.1 IMPACTED THIRD MOLAR TOOTH: Primary | ICD-10-CM

## 2022-11-01 ENCOUNTER — OFFICE VISIT (OUTPATIENT)
Dept: FAMILY MEDICINE CLINIC | Facility: CLINIC | Age: 22
End: 2022-11-01

## 2022-11-01 VITALS
SYSTOLIC BLOOD PRESSURE: 120 MMHG | WEIGHT: 154 LBS | OXYGEN SATURATION: 98 % | HEART RATE: 78 BPM | BODY MASS INDEX: 24.75 KG/M2 | RESPIRATION RATE: 18 BRPM | DIASTOLIC BLOOD PRESSURE: 70 MMHG | HEIGHT: 66 IN

## 2022-11-01 DIAGNOSIS — F64.0 GENDER DYSPHORIA IN ADULT: ICD-10-CM

## 2022-11-01 DIAGNOSIS — Z13.1 SCREENING FOR DIABETES MELLITUS: ICD-10-CM

## 2022-11-01 DIAGNOSIS — Z13.6 ENCOUNTER FOR LIPID SCREENING FOR CARDIOVASCULAR DISEASE: ICD-10-CM

## 2022-11-01 DIAGNOSIS — Z13.220 ENCOUNTER FOR LIPID SCREENING FOR CARDIOVASCULAR DISEASE: ICD-10-CM

## 2022-11-01 DIAGNOSIS — E34.9 HORMONE IMBALANCE: ICD-10-CM

## 2022-11-01 DIAGNOSIS — Z00.00 ROUTINE HEALTH MAINTENANCE: Primary | ICD-10-CM

## 2022-11-01 DIAGNOSIS — F90.2 ATTENTION DEFICIT HYPERACTIVITY DISORDER (ADHD), COMBINED TYPE: ICD-10-CM

## 2022-11-01 DIAGNOSIS — Z23 NEED FOR VACCINATION: ICD-10-CM

## 2022-11-01 PROCEDURE — 90686 IIV4 VACC NO PRSV 0.5 ML IM: CPT | Performed by: FAMILY MEDICINE

## 2022-11-01 PROCEDURE — 91312 COVID-19 (PFIZER) BIVALENT BOOSTER 12+YRS: CPT | Performed by: FAMILY MEDICINE

## 2022-11-01 PROCEDURE — 99213 OFFICE O/P EST LOW 20 MIN: CPT | Performed by: FAMILY MEDICINE

## 2022-11-01 PROCEDURE — 99395 PREV VISIT EST AGE 18-39: CPT | Performed by: FAMILY MEDICINE

## 2022-11-01 PROCEDURE — 3008F BODY MASS INDEX DOCD: CPT | Performed by: FAMILY MEDICINE

## 2022-11-01 PROCEDURE — 90471 IMMUNIZATION ADMIN: CPT | Performed by: FAMILY MEDICINE

## 2022-11-01 PROCEDURE — 0124A PR ADM SARSCOV2 30MCG/0.3ML BST: CPT | Performed by: FAMILY MEDICINE

## 2022-11-01 PROCEDURE — 2014F MENTAL STATUS ASSESS: CPT | Performed by: FAMILY MEDICINE

## 2022-11-01 RX ORDER — PROGESTERONE 50 MG/ML
10 INJECTION, SOLUTION INTRAMUSCULAR WEEKLY
Qty: 10 ML | Refills: 1 | Status: SHIPPED | OUTPATIENT
Start: 2022-11-01

## 2022-11-01 RX ORDER — ESTRADIOL VALERATE 20 MG/ML
6 INJECTION INTRAMUSCULAR 2 TIMES WEEKLY
Qty: 15 ML | Refills: 1 | Status: SHIPPED | OUTPATIENT
Start: 2022-11-03

## 2022-11-01 RX ORDER — PROGESTERONE 50 MG/ML
INJECTION, SOLUTION INTRAMUSCULAR DAILY
COMMUNITY
End: 2022-11-01

## 2022-11-01 NOTE — PROGRESS NOTES
"Chief Complaint  Annual Exam    Subjective    History of Present Illness {CC  Problem List  Visit  Diagnosis   Encounters  Notes  Medications  Labs  Result Review Imaging  Media :23}     Nadya Oseguera presents to [facility] for Annual Exam.  History of Present Illness     Here today for annual exam and to discuss a number of issues.    Overall doing well well. Has been off of her estradiol for a while now. Hoping for new prescriptions today. Would also like to switch to progesterone oil for injection. Motivated to get her transition back on track.    Living situation is much more stable now, living on her own and attending classes. Was previously treated for ADHD with Adderall. Did okay with it though had some difficulty with sleep and appetite. Wondering about trying lisdexamfetamine. PDMP is reviewed and appropriate.    No major changes to diet or exercise. Weight is up somewhat from last visit.    Has decent family and social support. Is enjoying school. Is due for dental exam    Objective     Vital Signs:   /70   Pulse 78   Resp 18   Ht 167.6 cm (66\")   Wt 69.9 kg (154 lb)   SpO2 98%   BMI 24.86 kg/m²   Physical Exam  Vitals and nursing note reviewed.   Constitutional:       General: She is not in acute distress.     Appearance: Normal appearance. She is not ill-appearing.   Cardiovascular:      Rate and Rhythm: Normal rate and regular rhythm.      Pulses: Normal pulses.      Heart sounds: Normal heart sounds. No murmur heard.  Pulmonary:      Effort: Pulmonary effort is normal. No respiratory distress.      Breath sounds: Normal breath sounds. No rales.   Neurological:      Mental Status: She is alert and oriented to person, place, and time. Mental status is at baseline.   Psychiatric:         Mood and Affect: Mood normal.         Behavior: Behavior normal.          Result Review  Data Reviewed:{ Labs  Result Review  Imaging  Med Tab  Media :23} "                   Assessment and Plan {CC Problem List  Visit Diagnosis  ROS  Review (Popup)  Health Maintenance  Quality  BestPractice  Medications  SmartSets  SnapShot Encounters  Media :23}   Diagnoses and all orders for this visit:    1. Routine health maintenance (Primary)    2. Gender dysphoria in adult  -     estradiol valerate (DELESTROGEN) 20 MG/ML injection; Inject 0.3 mL into the appropriate muscle as directed by prescriber 2 (Two) Times a Week.  Dispense: 15 mL; Refill: 1  -     progesterone oil 50 MG/ML injection; Inject 0.2 mL into the appropriate muscle as directed by prescriber 1 (One) Time Per Week.  Dispense: 10 mL; Refill: 1    3. Hormone imbalance  -     estradiol valerate (DELESTROGEN) 20 MG/ML injection; Inject 0.3 mL into the appropriate muscle as directed by prescriber 2 (Two) Times a Week.  Dispense: 15 mL; Refill: 1  -     progesterone oil 50 MG/ML injection; Inject 0.2 mL into the appropriate muscle as directed by prescriber 1 (One) Time Per Week.  Dispense: 10 mL; Refill: 1    4. Attention deficit hyperactivity disorder (ADHD), combined type  -     lisdexamfetamine (Vyvanse) 30 MG capsule; Take 1 capsule by mouth Every Morning  Dispense: 30 capsule; Refill: 0    5. Screening for diabetes mellitus  -     Comprehensive Metabolic Panel    6. Encounter for lipid screening for cardiovascular disease  -     Lipid Panel With LDL / HDL Ratio    7. Need for vaccination  -     FluLaval/Fluzone >6 mos ([id number])  -     COVID-19 Bivalent Booster (Pfizer) 12+yrs    Orders as above. Restart hormones as directed. We will plan on checking levels in about 2 months time. I will contact her with results of above lab testing.    Start lisdexamfetamine as above. Discussed anticipated effects of potential side effects.    Vaccines as requested.    Recommended follow-up as below. Encouraged communication via [employer] meantime.    Patient was given instructions and counseling regarding her condition  or for health maintenance advice. Please see specific information pulled into the AVS (placed there by myself) if appropriate.    Return in about 2 months (around 1/1/2023), or if symptoms worsen or fail to improve, for f/u gender-affirming hormone therapy.      AMARILYS Leary MD    Prevention counseling performed as below: Mindfulness for stress management.     [Normal] : the outer ears and nose were normal in appearance and the oropharynx was normal

## 2022-11-01 NOTE — PATIENT INSTRUCTIONS
Gender-Affirming Mental Health Providers    [facility]  [address] [place] [address]  [phone]  Dr. Honey Michel, PhD  + several other therapists  [place]mercial and [place]    [email or web address]  [address] [facility] [place] [address]  [phone]  Several therapists  Commercial Insurance    [facility]  [email or web address]  [address] [place] [address]   [phone]  Dr. Ras Pierre, PsyD  + several other therapists  Commercial Insurance    [facility]  [email or web address]  [address] [place] [address]  [phone]  Several therapists  [place] and [place] Medicaid    Danie Pastrana LCSW  (Online only)  [phone]  Phone: [phone] (on website)  [email or web address]    [facility]  [email or web address]  [address], [facility] [address] [place] [address]  [phone]  [facility]  [place]mercial and [place] Medicaid    [facility]  [facility] [email or web address]  [address] [facility] [address] [place] [address]   [phone]  AIDA Dan  Commercial & sliding scale    [facility]  [email or web address]  [address], [address] [place] [address]  [phone]  Carolyne Kowalski LCSW  [employer] & self-pay    [facility]  [email or web address]  [address], [facility] [address] [place] [address]  [phone]  Kaylee Montoya LCSW  [place] and [place]    [facility] [email or web address]  [address] [place] [address] [phone]  AIDA Rivera  Commercial Insurance    [facility]  [email or web address]  [address] [address] [address] [phone]  JARON Gutierrez  Self-pay with sliding scale    TIO Kimball (telehealth only)  Ryan  Leonela, Counselor, [place], [address]  [employer]  [phone]  Self-pay    24/7 Crisis Supports  National Crisis Text #: 'HOME' to 993-819  [employer] LGBTQ+ Crisis line: 1-238.338.3850  [employer] Crisis Text #: 'START' to 084-775     Mindfulness apps: Headspace, Smiling Mind, Liberate, Sowlmate    Mindfulness-Based Stress Reduction  Mindfulness-based stress reduction (MBSR) is a program that helps people learn to practice mindfulness. Mindfulness is the practice of intentionally paying attention to the present moment. It can be learned and practiced through techniques such as education, breathing exercises, meditation, and yoga. MBSR includes several mindfulness techniques in one program.  MBSR works best when you understand the treatment, are willing to try new things, and can commit to spending time practicing what you learn. MBSR training may include learning about:  How your emotions, thoughts, and reactions affect your body.  New ways to respond to things that cause negative thoughts to start (triggers).  How to notice your thoughts and let go of them.  Practicing awareness of everyday things that you normally do without thinking.  The techniques and goals of different types of meditation.  What are the benefits of MBSR?  MBSR can have many benefits, which include helping you to:  Develop self-awareness. This refers to knowing and understanding yourself.  Learn skills and attitudes that help you to participate in your own health care.  Learn new ways to care for yourself.  Be more accepting about how things are, and let things go.  Be less judgmental and approach things with an open mind.  Be patient with yourself and trust yourself more.  MBSR has also been shown to:  Reduce negative emotions, such as depression and anxiety.  Improve memory and focus.  Change how you sense and approach pain.  Boost your body's ability to fight infections.  Help you connect better with other  people.  Improve your sense of well-being.  Follow these instructions at home:    Find a local in-person or online MBSR program.  Set aside some time regularly for mindfulness practice.  Find a mindfulness practice that works best for you. This may include one or more of the following:  Meditation. Meditation involves focusing your mind on a certain thought or activity.  Breathing awareness exercises. These help you to stay present by focusing on your breath.  Body scan. For this practice, you lie down and pay attention to each part of your body from head to toe. You can identify tension and soreness and intentionally relax parts of your body.  Yoga. Yoga involves stretching and breathing, and it can improve your ability to move and be flexible. It can also provide an experience of testing your body's limits, which can help you release stress.  Mindful eating. This way of eating involves focusing on the taste, texture, color, and smell of each bite of food. Because this slows down eating and helps you feel full sooner, it can be an important part of a weight-loss plan.  Find a podcast or recording that provides guidance for breathing awareness, body scan, or meditation exercises. You can listen to these any time when you have a free moment to rest without distractions.  Follow your treatment plan as told by your health care provider. This may include taking regular medicines and making changes to your diet or lifestyle as recommended.  How to practice mindfulness  To do a basic awareness exercise:  Find a comfortable place to sit.  Pay attention to the present moment. Observe your thoughts, feelings, and surroundings just as they are.  Avoid placing judgment on yourself, your feelings, or your surroundings. Make note of any judgment that comes up, and let it go.  Your mind may wander, and that is okay. Make note of when your thoughts drift, and return your attention to the present moment.  To do basic mindfulness  meditation:  Find a comfortable place to sit. This may include a stable chair or a firm floor cushion.  Sit upright with your back straight. Let your arms fall next to your side with your hands resting on your legs.  If sitting in a chair, rest your feet flat on the floor.  If sitting on a cushion, cross your legs in front of you.  Keep your head in a neutral position with your chin dropped slightly. Relax your jaw and rest the tip of your tongue on the roof of your mouth. Drop your gaze to the floor. You can close your eyes if you like.  Breathe normally and pay attention to your breath. Feel the air moving in and out of your nose. Feel your belly expanding and relaxing with each breath.  Your mind may wander, and that is okay. Make note of when your thoughts drift, and return your attention to your breath.  Avoid placing judgment on yourself, your feelings, or your surroundings. Make note of any judgment or feelings that come up, let them go, and bring your attention back to your breath.  When you are ready, lift your gaze or open your eyes. Pay attention to how your body feels after the meditation.  Where to find more information  You can find more information about MBSR from:  Your health care provider.  Community-based meditation centers or programs.  Programs offered near you.  Summary  Mindfulness-based stress reduction (MBSR) is a program that teaches you how to intentionally pay attention to the present moment. It is used with other treatments to help you cope better with daily stress, emotions, and pain.  MBSR focuses on developing self-awareness, which allows you to respond to life stress without judgment or negative emotions.  MBSR programs may involve learning different mindfulness practices, such as breathing exercises, meditation, yoga, body scan, or mindful eating. Find a mindfulness practice that works best for you, and set aside time for it on a regular basis.  This information is not intended to  replace advice given to you by your health care provider. Make sure you discuss any questions you have with your health care provider.  Document Released: 04/26/2018 Document Revised: 11/30/2018 Document Reviewed: 04/26/2018  Elsevier Patient Education © 2020 [employer]

## 2022-11-02 LAB
ALBUMIN SERPL-MCNC: 4.8 G/DL (ref 4.1–5.2)
ALBUMIN/GLOB SERPL: 2.5 {RATIO} (ref 1.2–2.2)
ALP SERPL-CCNC: 53 IU/L (ref 44–121)
ALT SERPL-CCNC: 11 IU/L (ref 0–44)
AST SERPL-CCNC: 16 IU/L (ref 0–40)
BILIRUB SERPL-MCNC: 0.2 MG/DL (ref 0–1.2)
BUN SERPL-MCNC: 6 MG/DL (ref 6–20)
BUN/CREAT SERPL: 9 (ref 9–20)
CALCIUM SERPL-MCNC: 9.4 MG/DL (ref 8.7–10.2)
CHLORIDE SERPL-SCNC: 103 MMOL/L (ref 96–106)
CHOLEST SERPL-MCNC: 166 MG/DL (ref 100–199)
CO2 SERPL-SCNC: 26 MMOL/L (ref 20–29)
CREAT SERPL-MCNC: 0.69 MG/DL (ref 0.76–1.27)
EGFRCR SERPLBLD CKD-EPI 2021: 133 ML/MIN/1.73
GLOBULIN SER CALC-MCNC: 1.9 G/DL (ref 1.5–4.5)
GLUCOSE SERPL-MCNC: 82 MG/DL (ref 70–99)
HDLC SERPL-MCNC: 48 MG/DL
LDLC SERPL CALC-MCNC: 107 MG/DL (ref 0–99)
LDLC/HDLC SERPL: 2.2 RATIO (ref 0–3.6)
POTASSIUM SERPL-SCNC: 4.2 MMOL/L (ref 3.5–5.2)
PROT SERPL-MCNC: 6.7 G/DL (ref 6–8.5)
SODIUM SERPL-SCNC: 142 MMOL/L (ref 134–144)
TRIGL SERPL-MCNC: 53 MG/DL (ref 0–149)
VLDLC SERPL CALC-MCNC: 11 MG/DL (ref 5–40)